# Patient Record
Sex: FEMALE | Race: WHITE | NOT HISPANIC OR LATINO | Employment: FULL TIME | URBAN - METROPOLITAN AREA
[De-identification: names, ages, dates, MRNs, and addresses within clinical notes are randomized per-mention and may not be internally consistent; named-entity substitution may affect disease eponyms.]

---

## 2022-07-07 ENCOUNTER — APPOINTMENT (OUTPATIENT)
Dept: LAB | Facility: HOSPITAL | Age: 37
End: 2022-07-07
Attending: INTERNAL MEDICINE
Payer: COMMERCIAL

## 2022-07-07 DIAGNOSIS — R05.9 COUGH: ICD-10-CM

## 2022-07-07 DIAGNOSIS — R68.89 CONGESTION OF THROAT: ICD-10-CM

## 2022-07-07 LAB — SARS-COV-2 RNA RESP QL NAA+PROBE: NOT DETECTED

## 2022-07-07 PROCEDURE — 87635 SARS-COV-2 COVID-19 AMP PRB: CPT

## 2022-09-02 ENCOUNTER — LAB REQUISITION (OUTPATIENT)
Dept: LAB | Facility: HOSPITAL | Age: 37
End: 2022-09-02
Payer: COMMERCIAL

## 2022-09-02 DIAGNOSIS — R50.9 FEVER, UNSPECIFIED: ICD-10-CM

## 2022-09-02 LAB — SARS-COV-2 RNA RESP QL NAA+PROBE: DETECTED

## 2022-09-02 PROCEDURE — 87635 SARS-COV-2 COVID-19 AMP PRB: CPT | Performed by: INTERNAL MEDICINE

## 2023-09-18 ENCOUNTER — HOSPITAL ENCOUNTER (INPATIENT)
Facility: HOSPITAL | Age: 38
LOS: 2 days | Discharge: HOME | DRG: 065 | End: 2023-09-20
Attending: STUDENT IN AN ORGANIZED HEALTH CARE EDUCATION/TRAINING PROGRAM | Admitting: STUDENT IN AN ORGANIZED HEALTH CARE EDUCATION/TRAINING PROGRAM
Payer: COMMERCIAL

## 2023-09-18 DIAGNOSIS — Q21.12 PFO (PATENT FORAMEN OVALE): ICD-10-CM

## 2023-09-18 DIAGNOSIS — I63.9 ACUTE CVA (CEREBROVASCULAR ACCIDENT): Primary | ICD-10-CM

## 2023-09-18 DIAGNOSIS — I63.9 ACUTE STROKE DUE TO ISCHEMIA: ICD-10-CM

## 2023-09-18 DIAGNOSIS — I63.9 CRYPTOGENIC STROKE: ICD-10-CM

## 2023-09-18 LAB
(HCYS)2 SERPL-MCNC: 6.2 UMOL/L (ref 5.1–15.4)
ALBUMIN SERPL-MCNC: 4 G/DL (ref 3.5–5)
ALBUMIN/GLOB SERPL: 1.2 RATIO (ref 1.1–2)
ALP SERPL-CCNC: 48 UNIT/L (ref 40–150)
ALT SERPL-CCNC: 19 UNIT/L (ref 0–55)
APTT PPP: 22.6 SECONDS (ref 23.2–33.7)
AST SERPL-CCNC: 19 UNIT/L (ref 5–34)
B-HCG SERPL QL: NEGATIVE
BASOPHILS # BLD AUTO: 0.04 X10(3)/MCL
BASOPHILS NFR BLD AUTO: 0.7 %
BILIRUB SERPL-MCNC: 0.6 MG/DL
BUN SERPL-MCNC: 8.5 MG/DL (ref 7–18.7)
CALCIUM SERPL-MCNC: 9.7 MG/DL (ref 8.4–10.2)
CHLORIDE SERPL-SCNC: 105 MMOL/L (ref 98–107)
CHOLEST SERPL-MCNC: 160 MG/DL
CHOLEST/HDLC SERPL: 3 {RATIO} (ref 0–5)
CO2 SERPL-SCNC: 23 MMOL/L (ref 22–29)
CREAT SERPL-MCNC: 0.81 MG/DL (ref 0.55–1.02)
CRP SERPL-MCNC: <1 MG/L
D DIMER PPP IA.FEU-MCNC: 0.53 UG/ML FEU (ref 0–0.5)
EOSINOPHIL # BLD AUTO: 0.16 X10(3)/MCL (ref 0–0.9)
EOSINOPHIL NFR BLD AUTO: 2.6 %
ERYTHROCYTE [DISTWIDTH] IN BLOOD BY AUTOMATED COUNT: 13 % (ref 11.5–17)
FACT VIII ACT/NOR PPP: 212 % (ref 59–191)
FIBRINOGEN PPP-MCNC: 300 MG/DL (ref 210–463)
GFR SERPLBLD CREATININE-BSD FMLA CKD-EPI: >60 MLS/MIN/1.73/M2
GLOBULIN SER-MCNC: 3.4 GM/DL (ref 2.4–3.5)
GLUCOSE SERPL-MCNC: 97 MG/DL (ref 74–100)
HCT VFR BLD AUTO: 42.1 % (ref 37–47)
HDLC SERPL-MCNC: 46 MG/DL (ref 35–60)
HGB BLD-MCNC: 14.2 G/DL (ref 12–16)
IMM GRANULOCYTES # BLD AUTO: 0.02 X10(3)/MCL (ref 0–0.04)
IMM GRANULOCYTES NFR BLD AUTO: 0.3 %
INR PPP: 0.9
LDLC SERPL CALC-MCNC: 84 MG/DL (ref 50–140)
LYMPHOCYTES # BLD AUTO: 1.87 X10(3)/MCL (ref 0.6–4.6)
LYMPHOCYTES NFR BLD AUTO: 30.9 %
MAGNESIUM SERPL-MCNC: 2.1 MG/DL (ref 1.6–2.6)
MCH RBC QN AUTO: 29.5 PG (ref 27–31)
MCHC RBC AUTO-ENTMCNC: 33.7 G/DL (ref 33–36)
MCV RBC AUTO: 87.5 FL (ref 80–94)
MONOCYTES # BLD AUTO: 0.42 X10(3)/MCL (ref 0.1–1.3)
MONOCYTES NFR BLD AUTO: 6.9 %
NEUTROPHILS # BLD AUTO: 3.55 X10(3)/MCL (ref 2.1–9.2)
NEUTROPHILS NFR BLD AUTO: 58.6 %
NRBC BLD AUTO-RTO: 0 %
PLATELET # BLD AUTO: 233 X10(3)/MCL (ref 130–400)
PMV BLD AUTO: 10.1 FL (ref 7.4–10.4)
POCT GLUCOSE: 80 MG/DL (ref 70–110)
POTASSIUM SERPL-SCNC: 4.2 MMOL/L (ref 3.5–5.1)
PROT SERPL-MCNC: 7.4 GM/DL (ref 6.4–8.3)
PROTHROMBIN TIME: 12.4 SECONDS (ref 12.5–14.5)
RBC # BLD AUTO: 4.81 X10(6)/MCL (ref 4.2–5.4)
SODIUM SERPL-SCNC: 135 MMOL/L (ref 136–145)
TRIGL SERPL-MCNC: 148 MG/DL (ref 37–140)
TSH SERPL-ACNC: 2.54 UIU/ML (ref 0.35–4.94)
VLDLC SERPL CALC-MCNC: 30 MG/DL
WBC # SPEC AUTO: 6.06 X10(3)/MCL (ref 4.5–11.5)

## 2023-09-18 PROCEDURE — 81025 URINE PREGNANCY TEST: CPT | Performed by: NURSE PRACTITIONER

## 2023-09-18 PROCEDURE — 99223 PR INITIAL HOSPITAL CARE,LEVL III: ICD-10-PCS | Mod: ,,, | Performed by: PSYCHIATRY & NEUROLOGY

## 2023-09-18 PROCEDURE — 99285 EMERGENCY DEPT VISIT HI MDM: CPT | Mod: 25

## 2023-09-18 PROCEDURE — 86147 CARDIOLIPIN ANTIBODY EA IG: CPT | Performed by: STUDENT IN AN ORGANIZED HEALTH CARE EDUCATION/TRAINING PROGRAM

## 2023-09-18 PROCEDURE — 80061 LIPID PANEL: CPT | Performed by: NURSE PRACTITIONER

## 2023-09-18 PROCEDURE — 85025 COMPLETE CBC W/AUTO DIFF WBC: CPT | Performed by: NURSE PRACTITIONER

## 2023-09-18 PROCEDURE — 11000001 HC ACUTE MED/SURG PRIVATE ROOM

## 2023-09-18 PROCEDURE — 84443 ASSAY THYROID STIM HORMONE: CPT | Performed by: NURSE PRACTITIONER

## 2023-09-18 PROCEDURE — 86140 C-REACTIVE PROTEIN: CPT | Performed by: NURSE PRACTITIONER

## 2023-09-18 PROCEDURE — 85300 ANTITHROMBIN III ACTIVITY: CPT | Performed by: NURSE PRACTITIONER

## 2023-09-18 PROCEDURE — 85730 THROMBOPLASTIN TIME PARTIAL: CPT | Performed by: NURSE PRACTITIONER

## 2023-09-18 PROCEDURE — 85613 RUSSELL VIPER VENOM DILUTED: CPT | Performed by: NURSE PRACTITIONER

## 2023-09-18 PROCEDURE — 21400001 HC TELEMETRY ROOM

## 2023-09-18 PROCEDURE — 85610 PROTHROMBIN TIME: CPT | Performed by: NURSE PRACTITIONER

## 2023-09-18 PROCEDURE — 80053 COMPREHEN METABOLIC PANEL: CPT | Performed by: NURSE PRACTITIONER

## 2023-09-18 PROCEDURE — 82962 GLUCOSE BLOOD TEST: CPT

## 2023-09-18 PROCEDURE — 99223 1ST HOSP IP/OBS HIGH 75: CPT | Mod: ,,, | Performed by: PSYCHIATRY & NEUROLOGY

## 2023-09-18 PROCEDURE — 83695 ASSAY OF LIPOPROTEIN(A): CPT | Performed by: NURSE PRACTITIONER

## 2023-09-18 PROCEDURE — 83090 ASSAY OF HOMOCYSTEINE: CPT | Performed by: NURSE PRACTITIONER

## 2023-09-18 PROCEDURE — 25000003 PHARM REV CODE 250: Performed by: NURSE PRACTITIONER

## 2023-09-18 PROCEDURE — 85303 CLOT INHIBIT PROT C ACTIVITY: CPT | Performed by: NURSE PRACTITIONER

## 2023-09-18 PROCEDURE — 83735 ASSAY OF MAGNESIUM: CPT | Performed by: NURSE PRACTITIONER

## 2023-09-18 PROCEDURE — 25500020 PHARM REV CODE 255: Performed by: STUDENT IN AN ORGANIZED HEALTH CARE EDUCATION/TRAINING PROGRAM

## 2023-09-18 PROCEDURE — 85379 FIBRIN DEGRADATION QUANT: CPT | Performed by: NURSE PRACTITIONER

## 2023-09-18 PROCEDURE — 85240 CLOT FACTOR VIII AHG 1 STAGE: CPT | Performed by: NURSE PRACTITIONER

## 2023-09-18 PROCEDURE — 85384 FIBRINOGEN ACTIVITY: CPT | Performed by: NURSE PRACTITIONER

## 2023-09-18 RX ORDER — ASPIRIN 325 MG
325 TABLET, DELAYED RELEASE (ENTERIC COATED) ORAL
Status: COMPLETED | OUTPATIENT
Start: 2023-09-18 | End: 2023-09-18

## 2023-09-18 RX ORDER — LABETALOL HYDROCHLORIDE 5 MG/ML
10 INJECTION, SOLUTION INTRAVENOUS EVERY 6 HOURS PRN
Status: DISCONTINUED | OUTPATIENT
Start: 2023-09-18 | End: 2023-09-20 | Stop reason: HOSPADM

## 2023-09-18 RX ADMIN — IOPAMIDOL 100 ML: 755 INJECTION, SOLUTION INTRAVENOUS at 05:09

## 2023-09-18 RX ADMIN — ASPIRIN 325 MG: 325 TABLET, COATED ORAL at 03:09

## 2023-09-18 NOTE — HPI
37 year old female with a past medical history of migraines presented to ED on 9/18 for right arm numbness. She reported she went to bed on 9/17 at 10:30 pm and felt normal. She woke up at 5:00 am, worked out, got her kids ready. At 7:30 am, she was sitting on the toilet and had sudden onset right arm numbness. She did not have weakness or coordination problems. She was able to bathe herself without difficulties. She called her dad to help with the kids and came to ED. She reports she is not on a preventative for migraines but has had them since she was 10 years ago, feels like they are hormonal, has been seeing a chiropractor for migraines. CT head was negative. MRI brain showed an acute left frontal infarct as well as an old left cerebellar infarct. Neurology was consulted for stroke workup.

## 2023-09-18 NOTE — SUBJECTIVE & OBJECTIVE
No past medical history on file.    No past surgical history on file.    Review of patient's allergies indicates:  Not on File    Current Neurological Medications:     No current facility-administered medications on file prior to encounter.     No current outpatient medications on file prior to encounter.     Family History    None       Tobacco Use    Smoking status: Not on file    Smokeless tobacco: Not on file   Substance and Sexual Activity    Alcohol use: Not on file    Drug use: Not on file    Sexual activity: Not on file     Review of Systems   Neurological:  Positive for numbness. Negative for dizziness, tremors, seizures, syncope, facial asymmetry, speech difficulty, weakness, light-headedness and headaches.   All other systems reviewed and are negative.    Objective:     Vital Signs (Most Recent):  Temp: 98.2 °F (36.8 °C) (09/18/23 0900)  Pulse: 69 (09/18/23 1535)  Resp: 18 (09/18/23 1535)  BP: 117/88 (09/18/23 1535)  SpO2: 100 % (09/18/23 1535) Vital Signs (24h Range):  Temp:  [98.2 °F (36.8 °C)] 98.2 °F (36.8 °C)  Pulse:  [62-71] 69  Resp:  [16-19] 18  SpO2:  [98 %-100 %] 100 %  BP: (117-129)/(58-88) 117/88     Weight: 75.8 kg (167 lb)  There is no height or weight on file to calculate BMI.     Physical Exam  Vitals and nursing note reviewed.   Constitutional:       General: She is not in acute distress.     Appearance: Normal appearance. She is not toxic-appearing.   HENT:      Head: Normocephalic.   Eyes:      General: No visual field deficit.     Extraocular Movements:      Right eye: Normal extraocular motion and no nystagmus.      Left eye: Normal extraocular motion and no nystagmus.      Pupils: Pupils are equal, round, and reactive to light.   Cardiovascular:      Rate and Rhythm: Normal rate.      Pulses: Normal pulses.   Pulmonary:      Effort: Pulmonary effort is normal.      Breath sounds: Normal breath sounds.   Musculoskeletal:         General: No swelling. Normal range of motion.       Cervical back: Normal range of motion.      Right lower leg: No edema.      Left lower leg: No edema.   Skin:     General: Skin is warm and dry.      Capillary Refill: Capillary refill takes less than 2 seconds.   Neurological:      General: No focal deficit present.      Mental Status: She is alert and oriented to person, place, and time.      Cranial Nerves: No cranial nerve deficit, dysarthria or facial asymmetry.      Sensory: Sensory deficit (decreased sensation to right arm (right forearm to fingers)) present.      Motor: No weakness, tremor, atrophy, abnormal muscle tone, seizure activity or pronator drift.      Coordination: Coordination normal. Finger-Nose-Finger Test normal.   Psychiatric:         Attention and Perception: Attention normal.         Mood and Affect: Affect normal.         Speech: Speech normal.         Behavior: Behavior normal. Behavior is cooperative.          NEUROLOGICAL EXAMINATION:     MENTAL STATUS   Oriented to person, place, and time.   Speech: speech is normal     CRANIAL NERVES     CN III, IV, VI   Pupils are equal, round, and reactive to light.    GAIT AND COORDINATION      Coordination   Finger to nose coordination: normal      Significant Labs:   Recent Lab Results         09/18/23  1505   09/18/23  1113   09/18/23  0958   09/18/23  0903        Albumin/Globulin Ratio     1.2         Albumin     4.0         Alkaline Phosphatase     48         ALT     19         aPTT 22.6  Comment: For Minimal Heparin Infusion, the goal aPTT 64-85 seconds corresponds to an anti-Xa of 0.3-0.5.    For Low Intensity and High Intensity Heparin, the goal aPTT  seconds corresponds to an anti-Xa of 0.3-0.7             AST     19         Baso #     0.04         Basophil %     0.7         BILIRUBIN TOTAL     0.6         BUN     8.5         Calcium     9.7         Chloride     105         Cholesterol     160         CO2     23         Creatinine     0.81         eGFR     >60         Eos #     0.16          Eosinophil %     2.6         Globulin, Total     3.4         Glucose     97         HDL     46         Hematocrit     42.1         Hemoglobin     14.2         Immature Grans (Abs)     0.02         Immature Granulocytes     0.3         INR 0.9             LDL Cholesterol External     84.00         Lymph #     1.87         LYMPH %     30.9         Magnesium      2.10         MCH     29.5         MCHC     33.7         MCV     87.5         Mono #     0.42         Mono %     6.9         MPV     10.1         Neut #     3.55         Neut %     58.6         nRBC     0.0         Platelets     233         POCT Glucose       80       Potassium     4.2         Preg Test, Ur   Negative           PROTEIN TOTAL     7.4         Protime 12.4             RBC     4.81         RDW     13.0         Sodium     135         Thyroid Stimulating Hormone     2.544         Total Cholesterol/HDL Ratio     3         Triglycerides     148         Very Low Density Lipoprotein     30         WBC     6.06                 Significant Imaging: I have reviewed all pertinent imaging results/findings within the past 24 hours.

## 2023-09-18 NOTE — ED NOTES
Patient reports right arm numbness starting today. Reports that numbness was initially her entire right arm and has improved to only numbness to right hand at this time. No other obvious neuro deficits noted. Bilateral upper extremity strength 5/5.

## 2023-09-18 NOTE — ED PROVIDER NOTES
"Encounter Date: 9/18/2023       History     Chief Complaint   Patient presents with    Extremity Weakness     Right arm numbness that started an hr ago. Denies ha, slurred speech, - facial droop, no drift. . GCS 15. Neuro intact. Denies cp, sob, n/v     See MDM    The history is provided by the patient. No  was used.     Review of patient's allergies indicates:  Not on File  No past medical history on file.  No past surgical history on file.  No family history on file.     Review of Systems   Neurological:  Positive for numbness.        Right arm numbness and feels "foreign"   All other systems reviewed and are negative.      Physical Exam     Initial Vitals [09/18/23 0900]   BP Pulse Resp Temp SpO2   129/85 71 19 98.2 °F (36.8 °C) 98 %      MAP       --         Physical Exam    Nursing note and vitals reviewed.  Constitutional: She appears well-developed and well-nourished.   Eyes: Conjunctivae are normal.   Neck:   Normal range of motion.  Cardiovascular:  Normal rate, regular rhythm and normal heart sounds.           Pulmonary/Chest: Breath sounds normal. No respiratory distress.   Musculoskeletal:      Cervical back: Normal range of motion.     Neurological: She is alert and oriented to person, place, and time. She has normal strength. A sensory deficit is present. Coordination and gait normal.   Bilateral UE and LE strength equal and strong    She has decreased sensation along the fingers, palm. Forearm/wrist has intact sensation, forearm intact sensation, upper arm and shoulder intact sensation    Full ROM   Skin: Skin is warm and dry.   Psychiatric: She has a normal mood and affect.         ED Course   Procedures  Labs Reviewed   COMPREHENSIVE METABOLIC PANEL - Abnormal; Notable for the following components:       Result Value    Sodium Level 135 (*)     All other components within normal limits   LIPID PANEL - Abnormal; Notable for the following components:    Triglyceride 148 (*)     All " other components within normal limits   MAGNESIUM - Normal   PREGNANCY TEST, URINE RAPID - Normal   CBC WITH DIFFERENTIAL   PROTIME-INR   APTT   TSH   POCT GLUCOSE          Imaging Results              MRI Brain Without Contrast (Final result)  Result time 09/18/23 14:36:41      Final result by Zaheer Amezquita MD (09/18/23 14:36:41)                   Impression:      1.  Left frontal lobe acute nonhemorrhagic lacunar infarcts.    2.  Left cerebellar old lacunar infarct and very minimal chronic microvascular ischemia.      Electronically signed by: Zaheer Amezquita  Date:    09/18/2023  Time:    14:36               Narrative:    EXAMINATION:  MRI BRAIN WITHOUT CONTRAST    CLINICAL HISTORY:  Transient ischemic attack (TIA);    TECHNIQUE:  Multiplanar MRI sequences were performed of the brain without contrast.    COMPARISON:  CT of the brain September 18, 2023    FINDINGS:  There are acute nonhemorrhagic lacunar infarcts involving the left frontal lobe with diffusion weighted restrictions and ADC map dark signal on images 10 and 8 series 4.  There are also associated T2 FLAIR hyperintensities.  There is left cerebellar old lacunar infarct.  Right periventricular punctate T2 FLAIR hyperintensity is consistent with very minimal chronic ischemia. Gradient echo sequences demonstrate no evidence of de phasing artifact to suggest hemorrhagic byproducts. No evidence of diffusion restriction or ADC map signal drop out to suggest acute infarct. The sella and suprasellar areas are unremarkable.    The cerebellar tonsils are normally positioned. There is no acute intracranial hemorrhage, hydrocephalus, midline shift or mass effect. No acute extra axial fluid collections identified. The mastoid air cells are clear.                                       CT Head Without Contrast (Final result)  Result time 09/18/23 12:51:34      Final result by Semaj Gaona MD (09/18/23 12:51:34)                   Impression:      No acute  intracranial abnormality identified.  Findings of mild microvascular ischemic disease.      Electronically signed by: Semaj Gaona  Date:    09/18/2023  Time:    12:51               Narrative:    EXAMINATION:  CT HEAD WITHOUT CONTRAST    CLINICAL HISTORY:  Transient ischemic attack (TIA);    TECHNIQUE:  Low dose axial images were obtained through the head.  Coronal and sagittal reformations were also performed. Contrast was not administered.    Automatic exposure control was utilized to reduce the patient's radiation dose.    DLP= 954    COMPARISON:  None.    FINDINGS:  No acute intracranial hemorrhage, edema or mass. No acute parenchymal abnormality.    Mild cerebral atrophy with concordant ventricular enlargement.    There is normal gray white differentiation.    The osseous structures are normal.    The mastoid air cells are clear.    The auditory canals are patent bilaterally.    The globes and orbital contents are normal bilaterally.    The visualized maxillary, ethmoid and sphenoid sinuses are clear.                                       Medications   labetaloL injection 10 mg (has no administration in time range)   aspirin EC tablet 325 mg (325 mg Oral Given 9/18/23 0784)     Medical Decision Making  37-year-old female presents with sudden onset of right arm numbness from the top of her shoulder all the way to her fingertips that started around 730 this morning.  She states that she had been up doing her normal routine since 5:00 a.m. worked out with feeding the children and actually went to the bathroom where the right arm numbness started.  She states that it also felt like it weight 100 lb and it felt very foreign like she was not in control of her arm and it felt like someone else's arm.  She states that this made her nervous and so she decided to come and get it checked out after bringing the kids to school.  She states that as time has continued need the numbness has progressively improved and is now  only in her right hand and the foreign sensation and inability to control it has resolved as well she now has complete control of her right hand.  She denies any past medical history denies smoking.  She is on oral contraceptives.  She does have a history of migraines since she was 10 years old usually associated with her menstrual cycle however she does not currently have a headache she denies any neck pain there has been no trauma to her neck.    Labs unremarkable for acute findings. Ct shows chronic microvascular. With her OCP along with ct findings and concern with the story of the right arm numbness and questionable heaviness will get MRI brain.   MRI brain shows acute stroke. Will admit. Aspirin. Lipid panel. Stop OCP.     Amount and/or Complexity of Data Reviewed  Labs: ordered. Decision-making details documented in ED Course.  Radiology: ordered. Decision-making details documented in ED Course.  Discussion of management or test interpretation with external provider(s): Discussed with dr. Hernandez who also had face to face with patient.     Discussed with SANJAY Pavon with  who agrees to accept.     Risk  OTC drugs.  Decision regarding hospitalization.      Additional MDM:   Differential Diagnosis:   Other: The following diagnoses were also considered and will be evaluated: nerve palsy, stroke and electrolyte imbalance.            ED Course as of 09/18/23 1553   Mon Sep 18, 2023   1453 Patient is a 37-year-old female presents to the emergency department for right upper extremity numbness.  See HPI.  See physical exam.  She was in normal state of health this morning.  She had finished morning errands, had worked out and was used to the restroom when she began having sudden numbness.  On arrival she did not have any neuro deficits.  CT of the head did not show any evidence of acute stroke.  The last risk factors MRI obtained due to patient's symptomatology.  MRI with evidence of some frontal lobe infarct.  Patient  not a candidate for TNK, tPA due to minimal symptoms, NIH of 1.  Risks of bleeding outweigh benefits at this time.  Low suspicion for any large vessel occlusion given that she is not having any weakness and symptoms appear to be rapidly resolving.  Aspirin has been ordered.  Patient will be admitted for continued CVA workup and risk modification. [RP]      ED Course User Index  [RP] Fabio Hernandez MD                    Clinical Impression:   Final diagnoses:  [I63.9] Acute stroke due to ischemia        ED Disposition Condition    Admit                 Lauren Daigle, [facility]  09/18/23 7759

## 2023-09-18 NOTE — H&P
[facility]  Hospital Medicine History & Physical Examination       Patient Name: Sharon Shaver  MRN: [id number]  Patient Class: IP- Inpatient   Admission Date: 09/18/2023   Admitting Service: Hospital Medicine   Length of Stay: 0  Attending Physician: Tj Young MD  Primary Care Provider: Jennifer, Primary Doctor  Face-to-Face encounter date: 09/18/2023  Code Status:  Full code  Chief Complaint: Extremity Weakness (Right arm numbness that started an hr ago. Denies ha, slurred speech, - facial droop, no drift. . GCS 15. Neuro intact. Denies cp, sob, n/v)      Present at Bedside:  Spouse  Patient information was obtained from patient, patient's family, past medical records and ER records.      HISTORY OF PRESENT ILLNESS:   Sharon Shaver is a 37 y.o. female with PMHx of migraines who presented to [facility] on 9/18/2023 with c/o right arm numbness that began around 7:00 a.m. on the morning of presentation.  She denied any headache, slurred speech, CP, SOB, nausea, vomiting, vision changes.  Patient reports the numbness has improved and is now only affecting the fingers on her right hand. Initial NIH of 1. Patient reports a history of menstrual migraines that were worse with her menstrual cycle for which she has been on OCPs since she was a teenager, she stopped when she had her children and resumed taking OCPs in 2016 due to recurrent migraines.    Initial ED vital signs include /85, HR 71, RR 19, SpO2 98% on room air, temperature 98.2° F.  Labs notable for triglycerides 148.  Urine pregnancy test negative.  CT head without contrast negative for acute intracranial abnormality, findings of mild microvascular ischemic disease noted.  MRI of the brain without contrast demonstrated left frontal lobe acute nonhemorrhagic lacunar infarcts, left cerebellar old lacunar infarct and very minimal chronic microvascular ischemia.  She was given aspirin 325 mg p.o. in the ED. admitted to hospital medicine services  for further CVA workup.    REVIEW OF SYSTEMS:   Except as documented, all other systems reviewed and negative     PAST MEDICAL HISTORY:   Migraines    PAST SURGICAL HISTORY:   Negative    FAMILY HISTORY:   Mother: CVA  Aunt: Dementia  Father: PPM    SOCIAL HISTORY:   Denied alcohol, tobacco or illicit drug use    ALLERGIES:   Patient has no allergy information on record.    HOME MEDICATIONS:     Prior to Admission medications    Not on File     ________________________________________________________________________  INPATIENT LIST OF MEDICATIONS     Current Facility-Administered Medications:     labetaloL injection 10 mg, 10 mg, Intravenous, Q6H PRN, Monique Tamez, FELECIA-BC  No current outpatient medications on file.    Scheduled Meds:  Continuous Infusions:  PRN Meds:.labetalol    PHYSICAL EXAM:     VITAL SIGNS: 24 HRS MIN & MAX LAST   Temp  Min: 98.2 °F (36.8 °C)  Max: 98.2 °F (36.8 °C) 98.2 °F (36.8 °C)   BP  Min: 118/81  Max: 129/85 (!) 128/58   Pulse  Min: 62  Max: 71  64   Resp  Min: 16  Max: 19 16   SpO2  Min: 98 %  Max: 100 % 99 %       General appearance: Well-developed female in no apparent distress.  HENT: Atraumatic head. Moist mucous membranes of oral cavity.  Eyes: Normal extraocular movements.   Neck: Supple.   Lungs: Clear to auscultation bilaterally.   Heart: Regular rate and rhythm. S1 and S2 present. No pedal edema.  Abdomen: Soft, non-distended, non-tender.  Extremities: No cyanosis, clubbing  Skin: No Rash.   Neuro: Motor and sensory exams grossly intact. Right hand paresthesias.  Psych/mental status: Appropriate mood and affect. Responds appropriately to questions.     LABS AND IMAGING:     Recent Labs   Lab 09/18/23  0958   WBC 6.06   RBC 4.81   HGB 14.2   HCT 42.1   MCV 87.5   MCH 29.5   MCHC 33.7   RDW 13.0      MPV 10.1       Recent Labs   Lab 09/18/23  0958   *   K 4.2   CO2 23   BUN 8.5   CREATININE 0.81   CALCIUM 9.7   MG 2.10   ALBUMIN 4.0   ALKPHOS 48   ALT 19   AST  19   BILITOT 0.6       Microbiology Results (last 7 days)       ** No results found for the last 168 hours. **             MRI Brain Without Contrast  Narrative: EXAMINATION:  MRI BRAIN WITHOUT CONTRAST    CLINICAL HISTORY:  Transient ischemic attack (TIA);    TECHNIQUE:  Multiplanar MRI sequences were performed of the brain without contrast.    COMPARISON:  CT of the brain September 18, 2023    FINDINGS:  There are acute nonhemorrhagic lacunar infarcts involving the left frontal lobe with diffusion weighted restrictions and ADC map dark signal on images 10 and 8 series 4.  There are also associated T2 FLAIR hyperintensities.  There is left cerebellar old lacunar infarct.  Right periventricular punctate T2 FLAIR hyperintensity is consistent with very minimal chronic ischemia. Gradient echo sequences demonstrate no evidence of de phasing artifact to suggest hemorrhagic byproducts. No evidence of diffusion restriction or ADC map signal drop out to suggest acute infarct. The sella and suprasellar areas are unremarkable.    The cerebellar tonsils are normally positioned. There is no acute intracranial hemorrhage, hydrocephalus, midline shift or mass effect. No acute extra axial fluid collections identified. The mastoid air cells are clear.  Impression: 1.  Left frontal lobe acute nonhemorrhagic lacunar infarcts.    2.  Left cerebellar old lacunar infarct and very minimal chronic microvascular ischemia.    Electronically signed by: Zaheer Amezquita  Date:    09/18/2023  Time:    14:36  CT Head Without Contrast  Narrative: EXAMINATION:  CT HEAD WITHOUT CONTRAST    CLINICAL HISTORY:  Transient ischemic attack (TIA);    TECHNIQUE:  Low dose axial images were obtained through the head.  Coronal and sagittal reformations were also performed. Contrast was not administered.    Automatic exposure control was utilized to reduce the patient's radiation dose.    DLP= 954    COMPARISON:  None.    FINDINGS:  No acute intracranial  hemorrhage, edema or mass. No acute parenchymal abnormality.    Mild cerebral atrophy with concordant ventricular enlargement.    There is normal gray white differentiation.    The osseous structures are normal.    The mastoid air cells are clear.    The auditory canals are patent bilaterally.    The globes and orbital contents are normal bilaterally.    The visualized maxillary, ethmoid and sphenoid sinuses are clear.  Impression: No acute intracranial abnormality identified.  Findings of mild microvascular ischemic disease.    Electronically signed by: Semaj Jimenez  Date:    09/18/2023  Time:    12:51        ASSESSMENT & PLAN:   Acute left frontal lobe nonhemorrhagic CVA  Left cerebellar old lacunar infarct  - per MRI  Hx of Menstrual Cycle Related Migraines, OCP use    Plan:  Neurology consulted, follow up with recommendations  B Carotid US and ECHO pending  Lipid panel, hemoglobin A1c pending  Atypical CVA labs including , CRP, DRVVT, factor 8 assay, homocystine, lipoprotein a, protein C activity pending  Hold antihypertensives to allow for permissive hypertension  PRN labetalol as needed for SBP greater than 220 or DBP greater than 100  PT/OT/SLP to evaluate and treat when appropriate  Neuro checks every 4 hours  Fall precautions  Resume other appropriate home medications  Labs in AM     VTE Prophylaxis: SCDs    Discharge Planning and Disposition: TBD    DONTRELL, Monique Tamez, NP have reviewed and discussed the case with Tj Young MD  Please see the attending MD's addendum for further assessment and plan.    Monique Tamez, AGACNP-BC  09/18/2023    _______________________________________________________________________________  MD Addendum:  Dr. DONTRELL , assumed care of this patient today at --am/pm  For the patient encounter, I performed the substantive portion of the visit, I reviewed the NP/PA documentation, treatment plan, and medical decision making.  I had face to face time with this patient     LOREN  History:    B. Physical exam:    C. Medical decision making:      All diagnosis and differential diagnosis have been reviewed; assessment and plan has been documented; I have personally reviewed the labs and test results that are presently available; I have reviewed the patients medication list; I have reviewed the consulting providers response and recommendations. I have reviewed or attempted to review medical records based upon their availability.    All of the patient and family questions have been addressed and answered. Patient's is agreeable to the above stated plan. I will continue to monitor closely and make adjustments to medical management as needed.      09/18/2023

## 2023-09-18 NOTE — CONSULTS
[facility] - Emergency Dept  Neurology  Consult Note    Patient Name: Sharon Shaver  MRN: [id number]  Admission Date: 9/18/2023  Hospital Length of Stay: 0 days  Code Status: Full Code   Attending Provider: Tj Young MD   Consulting Provider: Lisa Springer NP  Primary Care Physician: Jennifer, Primary Doctor  Principal Problem:<principal problem not specified>    Inpatient consult to Neurology  Consult performed by: Lisa Springer NP  Consult ordered by: Monique Tamez, AGACNP-BC         Subjective:     Chief Complaint:  Right sided numbness       HPI:   37 year old female with a past medical history of migraines presented to ED on 9/18 for right arm numbness. She reported she went to bed on 9/17 at 10:30 pm and felt normal. She woke up at 5:00 am, worked out, got her kids ready. At 7:30 am, she was sitting on the toilet and had sudden onset right arm numbness. She did not have weakness or coordination problems. She was able to bathe herself without difficulties. She called her dad to help with the kids and came to ED. She reports she is not on a preventative for migraines but has had them since she was 10 years ago, feels like they are hormonal, has been seeing a chiropractor for migraines. CT head was negative. MRI brain showed an acute left frontal infarct as well as an old left cerebellar infarct. Neurology was consulted for stroke workup.        No past medical history on file.    No past surgical history on file.    Review of patient's allergies indicates:  Not on File    Current Neurological Medications:     No current facility-administered medications on file prior to encounter.     No current outpatient medications on file prior to encounter.     Family History    None       Tobacco Use    Smoking status: Not on file    Smokeless tobacco: Not on file   Substance and Sexual Activity    Alcohol use: Not on file    Drug use: Not on file    Sexual activity: Not on file     Review of  Systems   Neurological:  Positive for numbness. Negative for dizziness, tremors, seizures, syncope, facial asymmetry, speech difficulty, weakness, light-headedness and headaches.   All other systems reviewed and are negative.    Objective:     Vital Signs (Most Recent):  Temp: 98.2 °F (36.8 °C) (09/18/23 0900)  Pulse: 69 (09/18/23 1535)  Resp: 18 (09/18/23 1535)  BP: 117/88 (09/18/23 1535)  SpO2: 100 % (09/18/23 1535) Vital Signs (24h Range):  Temp:  [98.2 °F (36.8 °C)] 98.2 °F (36.8 °C)  Pulse:  [62-71] 69  Resp:  [16-19] 18  SpO2:  [98 %-100 %] 100 %  BP: (117-129)/(58-88) 117/88     Weight: 75.8 kg (167 lb)  There is no height or weight on file to calculate BMI.     Physical Exam  Vitals and nursing note reviewed.   Constitutional:       General: She is not in acute distress.     Appearance: Normal appearance. She is not toxic-appearing.   HENT:      Head: Normocephalic.   Eyes:      General: No visual field deficit.     Extraocular Movements:      Right eye: Normal extraocular motion and no nystagmus.      Left eye: Normal extraocular motion and no nystagmus.      Pupils: Pupils are equal, round, and reactive to light.   Cardiovascular:      Rate and Rhythm: Normal rate.      Pulses: Normal pulses.   Pulmonary:      Effort: Pulmonary effort is normal.      Breath sounds: Normal breath sounds.   Musculoskeletal:         General: No swelling. Normal range of motion.      Cervical back: Normal range of motion.      Right lower leg: No edema.      Left lower leg: No edema.   Skin:     General: Skin is warm and dry.      Capillary Refill: Capillary refill takes less than 2 seconds.   Neurological:      General: No focal deficit present.      Mental Status: She is alert and oriented to person, place, and time.      Cranial Nerves: No cranial nerve deficit, dysarthria or facial asymmetry.      Sensory: Sensory deficit (decreased sensation to right arm (right forearm to fingers)) present.      Motor: No weakness,  tremor, atrophy, abnormal muscle tone, seizure activity or pronator drift.      Coordination: Coordination normal. Finger-Nose-Finger Test normal.   Psychiatric:         Attention and Perception: Attention normal.         Mood and Affect: Affect normal.         Speech: Speech normal.         Behavior: Behavior normal. Behavior is cooperative.          NEUROLOGICAL EXAMINATION:     MENTAL STATUS   Oriented to person, place, and time.   Speech: speech is normal     CRANIAL NERVES     CN III, IV, VI   Pupils are equal, round, and reactive to light.    GAIT AND COORDINATION      Coordination   Finger to nose coordination: normal    Current NIH Stroke Score Values      Flowsheet Row Most Recent Value   1a. Level of Consciousness 0-->Alert, keenly responsive   1b. LOC Questions 0-->Answers both questions correctly   1c. LOC Commands 0-->Performs both tasks correctly   2. Best Gaze 0-->Normal   3. Visual 0-->No visual loss   4. Facial Palsy 0-->Normal symmetrical movements   5a. Motor Arm, Left 0-->No drift, limb holds 90 (or 45) degrees for full 10 secs   5b. Motor Arm, Right 0-->No drift, limb holds 90 (or 45) degrees for full 10 secs   6a. Motor Leg, Left 0-->No drift, leg holds 30 degree position for full 5 secs   6b. Motor Leg, Right 0-->No drift, leg holds 30 degree position for full 5 secs   7. Limb Ataxia 0-->Absent   8. Sensory 1-->decreased sensation    9. Best Language 0-->No aphasia, normal   10. Dysarthria 0-->Normal   11. Extinction and Inattention (formerly Neglect) 0-->No abnormality   Total (NIH Stroke Scale) 1              Significant Labs:   Recent Lab Results         09/18/23  1505   09/18/23  1113   09/18/23  0958   09/18/23  0903        Albumin/Globulin Ratio     1.2         Albumin     4.0         Alkaline Phosphatase     48         ALT     19         aPTT 22.6  Comment: For Minimal Heparin Infusion, the goal aPTT 64-85 seconds corresponds to an anti-Xa of 0.3-0.5.    For Low Intensity and High  Intensity Heparin, the goal aPTT  seconds corresponds to an anti-Xa of 0.3-0.7             AST     19         Baso #     0.04         Basophil %     0.7         BILIRUBIN TOTAL     0.6         BUN     8.5         Calcium     9.7         Chloride     105         Cholesterol     160         CO2     23         Creatinine     0.81         eGFR     >60         Eos #     0.16         Eosinophil %     2.6         Globulin, Total     3.4         Glucose     97         HDL     46         Hematocrit     42.1         Hemoglobin     14.2         Immature Grans (Abs)     0.02         Immature Granulocytes     0.3         INR 0.9             LDL Cholesterol External     84.00         Lymph #     1.87         LYMPH %     30.9         Magnesium      2.10         MCH     29.5         MCHC     33.7         MCV     87.5         Mono #     0.42         Mono %     6.9         MPV     10.1         Neut #     3.55         Neut %     58.6         nRBC     0.0         Platelets     233         POCT Glucose       80       Potassium     4.2         Preg Test, Ur   Negative           PROTEIN TOTAL     7.4         Protime 12.4             RBC     4.81         RDW     13.0         Sodium     135         Thyroid Stimulating Hormone     2.544         Total Cholesterol/HDL Ratio     3         Triglycerides     148         Very Low Density Lipoprotein     30         WBC     6.06                 Significant Imaging: I have reviewed all pertinent imaging results/findings within the past 24 hours.    Assessment and Plan:     Stroke  Stroke    Needs atypical stroke workup  Continue stroke workup  Will add CTA head and neck  Awaiting ECHO (will need bubble study)  Nonsmoker  On oral contraceptives     MRI Brain: acute left frontal infarct as well as an old left cerebellar infarct   CT head negative    Ok to eat if she passes her Mary  Will continue to follow   Currently on aspirin 325 mg         VTE Risk Mitigation (From admission, onward)            Ordered     IP VTE HIGH RISK PATIENT  Once         09/18/23 1528     Place sequential compression device  Until discontinued         09/18/23 1528                    Thank you for your consult.  Further recommendations to follow from MD Lisa Springer, NP  Neurology  [facility] - Emergency Dept    I have seen/examined the patient.  NP was scribe.  I agree with the findings unless outlined below:    César Ngo MD  [facility]     Mental Status: Alert and oriented x3. Language is fluent with good comprehension.    Cranial Nerve: Pupils are equal, round, and reactive to light. Visual fields are intact to confrontation. Normal fundi. Ocular movements are intact. Face is symmetric at rest and with activation with intact sensation throughout. Hearing intact to finger rub bilaterally. Muscles of tongue and palate activate symmetrically. No dysarthria. Strength is full in sternocleidomastoid and trapezius bilaterally.    Motor: Muscle bulk and tone are normal. Strength is 5/5 in all four extremities both proximally and distally. Intact fine motor movements bilaterally. There is no pronator drift or satelliting on arm roll.    Sensory: Sensation is intact to light touch, pinprick, vibration, and proprioception throughout. Romberg is negative.    Reflexes: 2+ and symmetric at the biceps, triceps, brachioradialis, patella, and Achilles bilaterally. Plantar response is flexor bilaterally.    Coordination: No dysmetria on finger-nose-finger or heel-knee-shin. Normal rapid alternating movements. Fast finger tapping with normal amplitude and speed.    Gait: Narrow based with normal stride length and good arm swing bilaterally. Able to walk on heels, toes, and in tandem.      Hx verified    Imaging reviewed    I think the stroke is 2.2 combination of OCP and PFO  Recc stopping OCP  Start ASA/statin per guidelines and followup with cardiology  Poss sleep study as outpatient  Hypercoag labs  drawn    No further recs   Signing off

## 2023-09-19 LAB
ALBUMIN SERPL-MCNC: 3.7 G/DL (ref 3.5–5)
ALBUMIN/GLOB SERPL: 1.2 RATIO (ref 1.1–2)
ALP SERPL-CCNC: 45 UNIT/L (ref 40–150)
ALT SERPL-CCNC: 21 UNIT/L (ref 0–55)
AORTIC ROOT ANNULUS: 3.4 CM
AORTIC VALVE CUSP SEPERATION: 1.9 CM
APTT PPP: 24.9 SECONDS (ref 23.2–33.7)
AST SERPL-CCNC: 21 UNIT/L (ref 5–34)
AV INDEX (PROSTH): 0.6
AV MEAN GRADIENT: 2 MMHG
AV PEAK GRADIENT: 4 MMHG
AV VALVE AREA BY VELOCITY RATIO: 2.09 CM²
AV VALVE AREA: 1.9 CM²
AV VELOCITY RATIO: 0.67
BASOPHILS # BLD AUTO: 0.05 X10(3)/MCL
BASOPHILS NFR BLD AUTO: 0.8 %
BILIRUB SERPL-MCNC: 0.4 MG/DL
BUN SERPL-MCNC: 13.5 MG/DL (ref 7–18.7)
CALCIUM SERPL-MCNC: 9.3 MG/DL (ref 8.4–10.2)
CHLORIDE SERPL-SCNC: 108 MMOL/L (ref 98–107)
CK MB SERPL-MCNC: <1 NG/ML
CO2 SERPL-SCNC: 25 MMOL/L (ref 22–29)
CREAT SERPL-MCNC: 0.88 MG/DL (ref 0.55–1.02)
CV ECHO LV RWT: 0.45 CM
DOP CALC AO PEAK VEL: 1.05 M/S
DOP CALC AO VTI: 21.5 CM
DOP CALC LVOT AREA: 3.1 CM2
DOP CALC LVOT DIAMETER: 2 CM
DOP CALC LVOT PEAK VEL: 0.7 M/S
DOP CALC LVOT STROKE VOLUME: 40.82 CM3
DOP CALC MV VTI: 22.2 CM
DOP CALCLVOT PEAK VEL VTI: 13 CM
E WAVE DECELERATION TIME: 235 MSEC
E/A RATIO: 0.88
E/E' RATIO: 5.11 M/S
ECHO LV POSTERIOR WALL: 1.11 CM (ref 0.6–1.1)
EOSINOPHIL # BLD AUTO: 0.38 X10(3)/MCL (ref 0–0.9)
EOSINOPHIL NFR BLD AUTO: 6.5 %
ERYTHROCYTE [DISTWIDTH] IN BLOOD BY AUTOMATED COUNT: 13.1 % (ref 11.5–17)
EST. AVERAGE GLUCOSE BLD GHB EST-MCNC: 102.5 MG/DL
FRACTIONAL SHORTENING: 28 % (ref 28–44)
GFR SERPLBLD CREATININE-BSD FMLA CKD-EPI: >60 MLS/MIN/1.73/M2
GLOBULIN SER-MCNC: 3.1 GM/DL (ref 2.4–3.5)
GLUCOSE SERPL-MCNC: 100 MG/DL (ref 74–100)
HBA1C MFR BLD: 5.2 %
HCT VFR BLD AUTO: 39.9 % (ref 37–47)
HGB BLD-MCNC: 13.4 G/DL (ref 12–16)
IMM GRANULOCYTES # BLD AUTO: 0.02 X10(3)/MCL (ref 0–0.04)
IMM GRANULOCYTES NFR BLD AUTO: 0.3 %
INR PPP: 1
INTERVENTRICULAR SEPTUM: 0.81 CM (ref 0.6–1.1)
LEFT CCA DIST DIAS: 16 CM/S
LEFT CCA DIST SYS: 97 CM/S
LEFT CCA PROX DIAS: 20 CM/S
LEFT CCA PROX SYS: 101 CM/S
LEFT ECA DIAS: 15 CM/S
LEFT ECA SYS: 81 CM/S
LEFT ICA DIST DIAS: 50 CM/S
LEFT ICA DIST SYS: 104 CM/S
LEFT ICA MID DIAS: 33 CM/S
LEFT ICA MID SYS: 84 CM/S
LEFT ICA PROX DIAS: 24 CM/S
LEFT ICA PROX SYS: 92 CM/S
LEFT INTERNAL DIMENSION IN SYSTOLE: 3.5 CM (ref 2.1–4)
LEFT VENTRICLE DIASTOLIC VOLUME: 112 ML
LEFT VENTRICLE SYSTOLIC VOLUME: 50.9 ML
LEFT VENTRICULAR INTERNAL DIMENSION IN DIASTOLE: 4.89 CM (ref 3.5–6)
LEFT VENTRICULAR MASS: 166.08 G
LEFT VERTEBRAL DIAS: 15 CM/S
LEFT VERTEBRAL SYS: 61 CM/S
LV LATERAL E/E' RATIO: 4.6 M/S
LV SEPTAL E/E' RATIO: 5.75 M/S
LVOT MG: 1 MMHG
LVOT MV: 0.44 CM/S
LYMPHOCYTES # BLD AUTO: 2.8 X10(3)/MCL (ref 0.6–4.6)
LYMPHOCYTES NFR BLD AUTO: 47.5 %
MAGNESIUM SERPL-MCNC: 2.1 MG/DL (ref 1.6–2.6)
MCH RBC QN AUTO: 29.5 PG (ref 27–31)
MCHC RBC AUTO-ENTMCNC: 33.6 G/DL (ref 33–36)
MCV RBC AUTO: 87.9 FL (ref 80–94)
MONOCYTES # BLD AUTO: 0.66 X10(3)/MCL (ref 0.1–1.3)
MONOCYTES NFR BLD AUTO: 11.2 %
MV MEAN GRADIENT: 1 MMHG
MV PEAK A VEL: 0.52 M/S
MV PEAK E VEL: 0.46 M/S
MV PEAK GRADIENT: 2 MMHG
MV STENOSIS PRESSURE HALF TIME: 58 MS
MV VALVE AREA BY CONTINUITY EQUATION: 1.84 CM2
MV VALVE AREA P 1/2 METHOD: 3.79 CM2
NEUTROPHILS # BLD AUTO: 1.98 X10(3)/MCL (ref 2.1–9.2)
NEUTROPHILS NFR BLD AUTO: 33.7 %
NRBC BLD AUTO-RTO: 0 %
OHS CV CAROTID RIGHT ICA EDV HIGHEST: 31
OHS CV CAROTID ULTRASOUND LEFT ICA/CCA RATIO: 1.07
OHS CV CAROTID ULTRASOUND RIGHT ICA/CCA RATIO: 0.95
OHS CV PV CAROTID LEFT HIGHEST CCA: 101
OHS CV PV CAROTID LEFT HIGHEST ICA: 104
OHS CV PV CAROTID RIGHT HIGHEST CCA: 98
OHS CV PV CAROTID RIGHT HIGHEST ICA: 93
OHS CV US CAROTID LEFT HIGHEST EDV: 50
OHS LV EJECTION FRACTION SIMPSONS BIPLANE MOD: 57 %
PHOSPHATE SERPL-MCNC: 4.7 MG/DL (ref 2.3–4.7)
PISA MRMAX VEL: 2.94 M/S
PISA TR MAX VEL: 1.99 M/S
PLATELET # BLD AUTO: 251 X10(3)/MCL (ref 130–400)
PMV BLD AUTO: 10.8 FL (ref 7.4–10.4)
POTASSIUM SERPL-SCNC: 4.6 MMOL/L (ref 3.5–5.1)
PROT SERPL-MCNC: 6.8 GM/DL (ref 6.4–8.3)
PROTHROMBIN TIME: 12.7 SECONDS (ref 12.5–14.5)
PV PEAK GRADIENT: 4 MMHG
PV PEAK VELOCITY: 1.06 M/S
RA MAJOR: 4.02 CM
RA PRESSURE ESTIMATED: 3 MMHG
RA WIDTH: 3.49 CM
RBC # BLD AUTO: 4.54 X10(6)/MCL (ref 4.2–5.4)
RIGHT CCA DIST DIAS: 18 CM/S
RIGHT CCA DIST SYS: 98 CM/S
RIGHT CCA PROX DIAS: 10 CM/S
RIGHT CCA PROX SYS: 75 CM/S
RIGHT ECA DIAS: 3 CM/S
RIGHT ECA SYS: 67 CM/S
RIGHT ICA DIST DIAS: 18 CM/S
RIGHT ICA DIST SYS: 69 CM/S
RIGHT ICA MID DIAS: 31 CM/S
RIGHT ICA MID SYS: 93 CM/S
RIGHT ICA PROX DIAS: 16 CM/S
RIGHT ICA PROX SYS: 69 CM/S
RIGHT VERTEBRAL DIAS: 17 CM/S
RIGHT VERTEBRAL SYS: 60 CM/S
RV MID DIAMA: 2.59 CM
RV TB RVSP: 5 MMHG
SODIUM SERPL-SCNC: 141 MMOL/L (ref 136–145)
TDI LATERAL: 0.1 M/S
TDI SEPTAL: 0.08 M/S
TDI: 0.09 M/S
TR MAX PG: 16 MMHG
TRICUSPID ANNULAR PLANE SYSTOLIC EXCURSION: 1.69 CM
TROPONIN I SERPL-MCNC: <0.01 NG/ML (ref 0–0.04)
TV REST PULMONARY ARTERY PRESSURE: 19 MMHG
WBC # SPEC AUTO: 5.89 X10(3)/MCL (ref 4.5–11.5)

## 2023-09-19 PROCEDURE — 80053 COMPREHEN METABOLIC PANEL: CPT | Performed by: NURSE PRACTITIONER

## 2023-09-19 PROCEDURE — 97161 PT EVAL LOW COMPLEX 20 MIN: CPT

## 2023-09-19 PROCEDURE — 84100 ASSAY OF PHOSPHORUS: CPT | Performed by: NURSE PRACTITIONER

## 2023-09-19 PROCEDURE — 83036 HEMOGLOBIN GLYCOSYLATED A1C: CPT | Performed by: NURSE PRACTITIONER

## 2023-09-19 PROCEDURE — 83735 ASSAY OF MAGNESIUM: CPT | Performed by: NURSE PRACTITIONER

## 2023-09-19 PROCEDURE — 84484 ASSAY OF TROPONIN QUANT: CPT | Performed by: NURSE PRACTITIONER

## 2023-09-19 PROCEDURE — 85730 THROMBOPLASTIN TIME PARTIAL: CPT | Performed by: NURSE PRACTITIONER

## 2023-09-19 PROCEDURE — 85610 PROTHROMBIN TIME: CPT | Performed by: NURSE PRACTITIONER

## 2023-09-19 PROCEDURE — 97165 OT EVAL LOW COMPLEX 30 MIN: CPT

## 2023-09-19 PROCEDURE — 82553 CREATINE MB FRACTION: CPT | Performed by: NURSE PRACTITIONER

## 2023-09-19 PROCEDURE — 92523 SPEECH SOUND LANG COMPREHEN: CPT

## 2023-09-19 PROCEDURE — 85025 COMPLETE CBC W/AUTO DIFF WBC: CPT | Performed by: NURSE PRACTITIONER

## 2023-09-19 PROCEDURE — 25000003 PHARM REV CODE 250: Performed by: STUDENT IN AN ORGANIZED HEALTH CARE EDUCATION/TRAINING PROGRAM

## 2023-09-19 PROCEDURE — 21400001 HC TELEMETRY ROOM

## 2023-09-19 RX ORDER — ASPIRIN 325 MG
325 TABLET, DELAYED RELEASE (ENTERIC COATED) ORAL DAILY
Status: DISCONTINUED | OUTPATIENT
Start: 2023-09-20 | End: 2023-09-20 | Stop reason: HOSPADM

## 2023-09-19 RX ORDER — ATORVASTATIN CALCIUM 40 MG/1
40 TABLET, FILM COATED ORAL DAILY
Status: DISCONTINUED | OUTPATIENT
Start: 2023-09-19 | End: 2023-09-20 | Stop reason: HOSPADM

## 2023-09-19 RX ORDER — ASPIRIN 81 MG/1
81 TABLET ORAL DAILY
Status: DISCONTINUED | OUTPATIENT
Start: 2023-09-19 | End: 2023-09-19

## 2023-09-19 RX ADMIN — ATORVASTATIN CALCIUM 40 MG: 40 TABLET, FILM COATED ORAL at 10:09

## 2023-09-19 RX ADMIN — ASPIRIN 81 MG: 81 TABLET, COATED ORAL at 10:09

## 2023-09-19 NOTE — CONSULTS
Inpatient consult to Cardiology  Consult performed by: Paula Christianson FNP  Consult ordered by: Lisa Springer NP  Reason for consult: YA        [facility] - 9th Floor Med Surg    Cardiology  Consult Note    Patient Name: Sharon Shaver  MRN: [id number]  Admission Date: 9/18/2023  Hospital Length of Stay: 1 days  Code Status: Full Code   Attending Provider: Tj Young MD   Consulting Provider: MORTEZA Claire  Primary Care Physician: No, Primary Doctor  Principal Problem:<principal problem not specified>    Patient information was obtained from patient, past medical records, and ER records.     Subjective:     Reason for Consult: YA     HPI:   Ms. Shaver is a 37 year old female who is unknown to [facility]. She presents to the ER on 9.18.23 with complaints of right arm numbness that began earlier that morning. She denies CP, SOB, palpitations, nausea, vomiting, HA, slurred speech, or vision changes. She reports improvement in the numbness. 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 She has since resumed taking them since 2016. Her lab work is significant for d-dimer 0.53 and triglycerides. An MRI of the brain was obtained and demonstrated left frontal acute nonhemorrhagic lacunar infarcts and left cerebellar old lacunar infarct. A CTA head and neck was obtained and negative for large vessel occlusion and multiple thyroid nodules seen with 1 hypervascular nodule in the left thyroid. An echo was obtained and demonstrated a positive bubble study. Neuro is on board with recommendations for YA to further evaluate positive bubble study, which is the reason [facility] has been consulted.     PMH: migraines  PSH: denies  Family History: Mother - CVA; Father - PPM   Social History: Denies alcohol, tobacco, or illicit drug use.     Previous Cardiac Diagnostics:   Limited TTE 9.19.23:  Limited to repeat bubble study. Bubble study  is positive for intracardia shunt grade 3.    TTE 9.19.23:  Left Ventricle: The left ventricle is normal in size. Mildly increased wall thickness. There is normal systolic function with a visually estimated ejection fraction of 55 - 60%. There is normal diastolic function.  Right Ventricle: Normal right ventricular cavity size.  IVC/SVC: Normal venous pressure at 3 mmHg.    Review of patient's allergies indicates:   Allergen Reactions    Sulfa (sulfonamide antibiotics) Hives       Review of Systems   Respiratory:  Negative for shortness of breath.    Cardiovascular:  Negative for chest pain and palpitations.   Neurological:  Positive for numbness.       Objective:     Vital Signs (Most Recent):  Temp: 98.7 °F (37.1 °C) (09/19/23 0727)  Pulse: 75 (09/19/23 0727)  Resp: 19 (09/19/23 0727)  BP: 111/74 (09/19/23 0727)  SpO2: 100 % (09/19/23 0727) Vital Signs (24h Range):  Temp:  [98.6 °F (37 °C)-98.7 °F (37.1 °C)] 98.7 °F (37.1 °C)  Pulse:  [62-79] 75  Resp:  [16-21] 19  SpO2:  [96 %-100 %] 100 %  BP: (111-128)/(58-88) 111/74     Weight: 75.8 kg (167 lb)  There is no height or weight on file to calculate BMI.    SpO2: 100 %         Intake/Output Summary (Last 24 hours) at 9/19/2023 1055  Last data filed at 9/19/2023 0428  Gross per 24 hour   Intake 120 ml   Output --   Net 120 ml       Lines/Drains/Airways       Peripheral Intravenous Line  Duration                  Peripheral IV - Single Lumen 09/18/23 1456 20 G Right Antecubital <1 day                    Significant Labs:  Recent Results (from the past 72 hour(s))   POCT glucose    Collection Time: 09/18/23  9:03 AM   Result Value Ref Range    POCT Glucose 80 70 - 110 mg/dL   CBC with Differential    Collection Time: 09/18/23  9:58 AM   Result Value Ref Range    WBC 6.06 4.50 - 11.50 x10(3)/mcL    RBC 4.81 4.20 - 5.40 x10(6)/mcL    Hgb 14.2 12.0 - 16.0 g/dL    Hct 42.1 37.0 - 47.0 %    MCV 87.5 80.0 - 94.0 fL    MCH 29.5 27.0 - 31.0 pg    MCHC 33.7 33.0 - 36.0 g/dL     RDW 13.0 11.5 - 17.0 %    Platelet 233 130 - 400 x10(3)/mcL    MPV 10.1 7.4 - 10.4 fL    Neut % 58.6 %    Lymph % 30.9 %    Mono % 6.9 %    Eos % 2.6 %    Basophil % 0.7 %    Lymph # 1.87 0.6 - 4.6 x10(3)/mcL    Neut # 3.55 2.1 - 9.2 x10(3)/mcL    Mono # 0.42 0.1 - 1.3 x10(3)/mcL    Eos # 0.16 0 - 0.9 x10(3)/mcL    Baso # 0.04 <=0.2 x10(3)/mcL    IG# 0.02 0 - 0.04 x10(3)/mcL    IG% 0.3 %    NRBC% 0.0 %   Comprehensive Metabolic Panel    Collection Time: 09/18/23  9:58 AM   Result Value Ref Range    Sodium Level 135 (L) 136 - 145 mmol/L    Potassium Level 4.2 3.5 - 5.1 mmol/L    Chloride 105 98 - 107 mmol/L    Carbon Dioxide 23 22 - 29 mmol/L    Glucose Level 97 74 - 100 mg/dL    Blood Urea Nitrogen 8.5 7.0 - 18.7 mg/dL    Creatinine 0.81 0.55 - 1.02 mg/dL    Calcium Level Total 9.7 8.4 - 10.2 mg/dL    Protein Total 7.4 6.4 - 8.3 gm/dL    Albumin Level 4.0 3.5 - 5.0 g/dL    Globulin 3.4 2.4 - 3.5 gm/dL    Albumin/Globulin Ratio 1.2 1.1 - 2.0 ratio    Bilirubin Total 0.6 <=1.5 mg/dL    Alkaline Phosphatase 48 40 - 150 unit/L    Alanine Aminotransferase 19 0 - 55 unit/L    Aspartate Aminotransferase 19 5 - 34 unit/L    eGFR >60 mls/min/1.73/m2   Magnesium    Collection Time: 09/18/23  9:58 AM   Result Value Ref Range    Magnesium Level 2.10 1.60 - 2.60 mg/dL   Lipid Panel    Collection Time: 09/18/23  9:58 AM   Result Value Ref Range    Cholesterol Total 160 <=200 mg/dL    HDL Cholesterol 46 35 - 60 mg/dL    Triglyceride 148 (H) 37 - 140 mg/dL    Cholesterol/HDL Ratio 3 0 - 5    Very Low Density Lipoprotein 30     LDL Cholesterol 84.00 50.00 - 140.00 mg/dL   TSH    Collection Time: 09/18/23  9:58 AM   Result Value Ref Range    Thyroid Stimulating Hormone 2.544 0.350 - 4.940 uIU/mL   C-reactive protein    Collection Time: 09/18/23  9:58 AM   Result Value Ref Range    C-Reactive Protein <1.00 <5.00 mg/L   Pregnancy, urine rapid    Collection Time: 09/18/23 11:13 AM   Result Value Ref Range    Beta hCG Qualitative, Urine  Negative Negative   Protime-INR    Collection Time: 09/18/23  3:05 PM   Result Value Ref Range    PT 12.4 (L) 12.5 - 14.5 seconds    INR 0.9 <=1.3   APTT    Collection Time: 09/18/23  3:05 PM   Result Value Ref Range    PTT 22.6 (L) 23.2 - 33.7 seconds   Fibrinogen    Collection Time: 09/18/23  3:05 PM   Result Value Ref Range    Fibrinogen 300.0 210.0 - 463.0 mg/dL   D-dimer, quantitative    Collection Time: 09/18/23  3:05 PM   Result Value Ref Range    D-Dimer 0.53 (H) 0.00 - 0.50 ug/mL FEU   Homocysteine, serum    Collection Time: 09/18/23  5:05 PM   Result Value Ref Range    Homocysteine Total 6.2 5.1 - 15.4 umol/L   Factor 8 assay    Collection Time: 09/18/23  5:05 PM   Result Value Ref Range    Factor VIII 212 (H) 59 - 191 %   CV Ultrasound Bilateral Doppler Carotid    Collection Time: 09/18/23  8:13 PM   Result Value Ref Range    Right CCA prox sys 75 cm/s    Right CCA prox mccall 10 cm/s    Right CCA dist sys 98 cm/s    Right CCA dist mccall 18 cm/s    Right ICA prox sys 69 cm/s    Right ICA prox mccall 16 cm/s    Right ICA mid sys 93 cm/s    Right ICA mid mccall 31 cm/s    Right ICA dist sys 69 cm/s    Right ICA dist mccall 18 cm/s    Right ECA sys 67 cm/s    Right ECA mccall 3 cm/s    Right vertebral sys 60 cm/s    Right vertebral mccall 17 cm/s    Right ICA/CCA ratio 0.95     Right Highest ICA 93.00     Right Highest EDV 31.00     Right Highest CCA 98     Left CCA prox sys 101 cm/s    Left CCA prox mccall 20 cm/s    Left CCA dist sys 97 cm/s    Left CCA dist mccall 16 cm/s    Left ICA prox sys 92 cm/s    Left ICA prox mccall 24 cm/s    Left ICA mid sys 84 cm/s    Left ICA mid mccall 33 cm/s    Left ICA dist sys 104 cm/s    Left ICA dist mccall 50 cm/s    Left ECA sys 81 cm/s    Left ECA mccall 15 cm/s    Left vertebral sys 61 cm/s    Left vertebral mccall 15 cm/s    Left ICA/CCA ratio 1.07     Left Highest .00     LT Highest EDV 50.00     Left Highest     Echo    Collection Time: 09/18/23  8:17 PM   Result Value Ref  Range    Ron's Biplane MOD Ejection Fraction 57 %    LVOT stroke volume 40.82 cm3    LVIDd 4.89 3.5 - 6.0 cm    LV Systolic Volume 50.90 mL    LVIDs 3.50 2.1 - 4.0 cm    LV Diastolic Volume 112.00 mL    IVS 0.81 0.6 - 1.1 cm    LVOT diameter 2.00 cm    LVOT area 3.1 cm2    FS 28 28 - 44 %    Left Ventricle Relative Wall Thickness 0.45 cm    Posterior Wall 1.11 (A) 0.6 - 1.1 cm    LV mass 166.08 g    MV Peak E Ernesto 0.46 m/s    TDI LATERAL 0.10 m/s    TDI SEPTAL 0.08 m/s    E/E' ratio 5.11 m/s    MV Peak A Ernesto 0.52 m/s    TR Max Ernesto 1.99 m/s    E/A ratio 0.88     E wave deceleration time 235.00 msec    LV SEPTAL E/E' RATIO 5.75 m/s    LV LATERAL E/E' RATIO 4.60 m/s    LVOT peak ernesto 0.70 m/s    Left Ventricular Outflow Tract Mean Velocity 0.44 cm/s    Left Ventricular Outflow Tract Mean Gradient 1.00 mmHg    RV mid diameter 2.59 cm    TAPSE 1.69 cm    RA Major Axis 4.02 cm    RA Width 3.49 cm    AV mean gradient 2 mmHg    AV peak gradient 4 mmHg    Ao peak ernesto 1.05 m/s    Ao VTI 21.50 cm    LVOT peak VTI 13.00 cm    AV valve area 1.90 cm²    AV Velocity Ratio 0.67     AV index (prosthetic) 0.60     MEL by Velocity Ratio 2.09 cm²    Mr max ernesto 2.94 m/s    MV mean gradient 1 mmHg    MV peak gradient 2 mmHg    MV stenosis pressure 1/2 time 58.00 ms    MV valve area p 1/2 method 3.79 cm2    MV valve area by continuity eq 1.84 cm2    MV VTI 22.2 cm    Triscuspid Valve Regurgitation Peak Gradient 16 mmHg    PV PEAK VELOCITY 1.06 m/s    PV peak gradient 4 mmHg    Ao root annulus 3.40 cm    Mean e' 0.09 m/s    AORTIC VALVE CUSP SEPERATION 1.90 cm    TV resting pulmonary artery pressure 19 mmHg    RV TB RVSP 5 mmHg    Est. RA pres 3 mmHg   Comprehensive metabolic panel    Collection Time: 09/19/23  3:45 AM   Result Value Ref Range    Sodium Level 141 136 - 145 mmol/L    Potassium Level 4.6 3.5 - 5.1 mmol/L    Chloride 108 (H) 98 - 107 mmol/L    Carbon Dioxide 25 22 - 29 mmol/L    Glucose Level 100 74 - 100 mg/dL    Blood  Urea Nitrogen 13.5 7.0 - 18.7 mg/dL    Creatinine 0.88 0.55 - 1.02 mg/dL    Calcium Level Total 9.3 8.4 - 10.2 mg/dL    Protein Total 6.8 6.4 - 8.3 gm/dL    Albumin Level 3.7 3.5 - 5.0 g/dL    Globulin 3.1 2.4 - 3.5 gm/dL    Albumin/Globulin Ratio 1.2 1.1 - 2.0 ratio    Bilirubin Total 0.4 <=1.5 mg/dL    Alkaline Phosphatase 45 40 - 150 unit/L    Alanine Aminotransferase 21 0 - 55 unit/L    Aspartate Aminotransferase 21 5 - 34 unit/L    eGFR >60 mls/min/1.73/m2   Magnesium    Collection Time: 09/19/23  3:45 AM   Result Value Ref Range    Magnesium Level 2.10 1.60 - 2.60 mg/dL   Phosphorus    Collection Time: 09/19/23  3:45 AM   Result Value Ref Range    Phosphorus Level 4.7 2.3 - 4.7 mg/dL   Troponin I    Collection Time: 09/19/23  3:45 AM   Result Value Ref Range    Troponin-I <0.010 0.000 - 0.045 ng/mL   CK-MB    Collection Time: 09/19/23  3:45 AM   Result Value Ref Range    Creatine Kinase MB <1.0 <=3.4 ng/mL   APTT    Collection Time: 09/19/23  3:45 AM   Result Value Ref Range    PTT 24.9 23.2 - 33.7 seconds   Protime-INR    Collection Time: 09/19/23  3:45 AM   Result Value Ref Range    PT 12.7 12.5 - 14.5 seconds    INR 1.0 <=1.3   Hemoglobin A1C    Collection Time: 09/19/23  3:45 AM   Result Value Ref Range    Hemoglobin A1c 5.2 <=7.0 %    Estimated Average Glucose 102.5 mg/dL   CBC with Differential    Collection Time: 09/19/23  3:45 AM   Result Value Ref Range    WBC 5.89 4.50 - 11.50 x10(3)/mcL    RBC 4.54 4.20 - 5.40 x10(6)/mcL    Hgb 13.4 12.0 - 16.0 g/dL    Hct 39.9 37.0 - 47.0 %    MCV 87.9 80.0 - 94.0 fL    MCH 29.5 27.0 - 31.0 pg    MCHC 33.6 33.0 - 36.0 g/dL    RDW 13.1 11.5 - 17.0 %    Platelet 251 130 - 400 x10(3)/mcL    MPV 10.8 (H) 7.4 - 10.4 fL    Neut % 33.7 %    Lymph % 47.5 %    Mono % 11.2 %    Eos % 6.5 %    Basophil % 0.8 %    Lymph # 2.80 0.6 - 4.6 x10(3)/mcL    Neut # 1.98 (L) 2.1 - 9.2 x10(3)/mcL    Mono # 0.66 0.1 - 1.3 x10(3)/mcL    Eos # 0.38 0 - 0.9 x10(3)/mcL    Baso # 0.05 <=0.2  x10(3)/mcL    IG# 0.02 0 - 0.04 x10(3)/mcL    IG% 0.3 %    NRBC% 0.0 %       Significant Imaging:  Imaging Results              CTA Head and Neck (xpd) (Final result)  Result time 09/18/23 17:56:29      Final result by Rachael Chery MD (09/18/23 17:56:29)                   Impression:        No evidence large vessel occlusion seen    Multiple thyroid nodules seen with 1 hypervascular nodule seen in the left thyroid.  Ultrasound correlation is recommended      Electronically signed by: Rachael Chery  Date:    09/18/2023  Time:    17:56               Narrative:    EXAMINATION:  CTA HEAD AND NECK (XPD)    CLINICAL HISTORY:  Stroke/TIA, determine embolic source;    TECHNIQUE:  Non contrast low dose axial images were obtained through the head.  CT angiogram was performed from the level of the nnamdi to the top of the head following the IV administration 100 cc of Isovue 370 contrast .  Sagittal and coronal reconstructions and maximum intensity projection reconstructions were performed. Arterial stenosis percentages are based on NASCET measurement criteria.  Additional multiplanar reconstructions were performed on post processed imaging.  Automatic exposure control (AEC) is utilized to reduce patient radiation exposure.    COMPARISON:  None    FINDINGS:  Source images: No intracranial mass lesion is seen.  No hemorrhage is seen.  No infarct is seen.  Ventricles and basilar cisterns appear grossly unremarkable.  No cervical mass or lesion is seen.  No abnormal lymphadenopathy seen.  The thyroid is heterogeneous with areas of nodularity in the thyroid and a very hypervascular nodule seen in the left thyroid.  Thyroid ultrasound is recommended.  The hypervascular nodule measures 1 cm by 9 mm..  Larynx appears normal.  Trachea is midline.  Thoracic inlet appears normal.    Vascular images:    The aortic arch is normal in caliber.  Proximal great vessels are widely patent.  No significant plaque is seen.    The  common carotid arteries are widely patent.  No significant plaque or stenosis is seen.    The carotid bulbs have no significant plaque or stenosis.  No calcification is seen.    The internal carotid arteries are widely patent.  No areas of stenosis or plaque is seen.  Internal carotid arteries are seen to level the petrous ridge and clinoid and supraclinoid portions.  They are free of plaque.    The MCAs are patent.  M1 segments, M2 segments M3 segments are patent.  No aneurysm or obstruction is seen.    A1 segments are patent.  Anterior communicating arteries patent.  Anterior cerebral arteries are widely patent with no evidence of aneurysm or obstruction seen.    Both vertebral arteries are widely patent.  They are normal in caliber.  No stenosis is seen.  No dissection is seen.  Both vertebral arteries perfuse a normal appearing basilar artery.  Basilar artery shows no evidence of occlusion or aneurysm.  The posterior cerebral artery is patent the right and left side.  There is persistent fetal circulation of the left posterior cerebral artery which is a normal variant.    .                                       MRI Brain Without Contrast (Final result)  Result time 09/18/23 14:36:41      Final result by Zaheer Amezquita MD (09/18/23 14:36:41)                   Impression:      1.  Left frontal lobe acute nonhemorrhagic lacunar infarcts.    2.  Left cerebellar old lacunar infarct and very minimal chronic microvascular ischemia.      Electronically signed by: Zaheer Amezquita  Date:    09/18/2023  Time:    14:36               Narrative:    EXAMINATION:  MRI BRAIN WITHOUT CONTRAST    CLINICAL HISTORY:  Transient ischemic attack (TIA);    TECHNIQUE:  Multiplanar MRI sequences were performed of the brain without contrast.    COMPARISON:  CT of the brain September 18, 2023    FINDINGS:  There are acute nonhemorrhagic lacunar infarcts involving the left frontal lobe with diffusion weighted restrictions and ADC map dark  signal on images 10 and 8 series 4.  There are also associated T2 FLAIR hyperintensities.  There is left cerebellar old lacunar infarct.  Right periventricular punctate T2 FLAIR hyperintensity is consistent with very minimal chronic ischemia. Gradient echo sequences demonstrate no evidence of de phasing artifact to suggest hemorrhagic byproducts. No evidence of diffusion restriction or ADC map signal drop out to suggest acute infarct. The sella and suprasellar areas are unremarkable.    The cerebellar tonsils are normally positioned. There is no acute intracranial hemorrhage, hydrocephalus, midline shift or mass effect. No acute extra axial fluid collections identified. The mastoid air cells are clear.                                       CT Head Without Contrast (Final result)  Result time 09/18/23 12:51:34      Final result by Semaj Gaona MD (09/18/23 12:51:34)                   Impression:      No acute intracranial abnormality identified.  Findings of mild microvascular ischemic disease.      Electronically signed by: Semaj Gaona  Date:    09/18/2023  Time:    12:51               Narrative:    EXAMINATION:  CT HEAD WITHOUT CONTRAST    CLINICAL HISTORY:  Transient ischemic attack (TIA);    TECHNIQUE:  Low dose axial images were obtained through the head.  Coronal and sagittal reformations were also performed. Contrast was not administered.    Automatic exposure control was utilized to reduce the patient's radiation dose.    DLP= 954    COMPARISON:  None.    FINDINGS:  No acute intracranial hemorrhage, edema or mass. No acute parenchymal abnormality.    Mild cerebral atrophy with concordant ventricular enlargement.    There is normal gray white differentiation.    The osseous structures are normal.    The mastoid air cells are clear.    The auditory canals are patent bilaterally.    The globes and orbital contents are normal bilaterally.    The visualized maxillary, ethmoid and sphenoid sinuses are  clear.                                      EKG:  none to review    Telemetry:  SR    Physical Exam  HENT:      Head: Normocephalic.      Nose: Nose normal.      Mouth/Throat:      Mouth: Mucous membranes are moist.   Eyes:      Extraocular Movements: Extraocular movements intact.   Cardiovascular:      Rate and Rhythm: Normal rate and regular rhythm.      Pulses: Normal pulses.      Heart sounds: Normal heart sounds.   Pulmonary:      Effort: Pulmonary effort is normal.      Breath sounds: Normal breath sounds.   Abdominal:      Palpations: Abdomen is soft.   Skin:     General: Skin is warm and dry.   Neurological:      Mental Status: She is alert and oriented to person, place, and time.      Comments: Numbness to RUE   Psychiatric:         Behavior: Behavior normal.         Home Medications:   No current facility-administered medications on file prior to encounter.     No current outpatient medications on file prior to encounter.       Current Inpatient Medications:    Current Facility-Administered Medications:     aspirin EC tablet 81 mg, 81 mg, Oral, Daily, Tj Young MD, 81 mg at 09/19/23 1008    atorvastatin tablet 40 mg, 40 mg, Oral, Daily, Tj Young MD, 40 mg at 09/19/23 1008    labetaloL injection 10 mg, 10 mg, Intravenous, Q6H PRN, Monique Tamez, AGACNP-BC         VTE Risk Mitigation (From admission, onward)           Ordered     IP VTE HIGH RISK PATIENT  Once         09/18/23 1528     Place sequential compression device  Until discontinued         09/18/23 1528                    Assessment:   Acute Left Frontal Lobe Nonhemorrhagic CVA  Left Cerebellar Old Lacunar Infarct  + Bubble Study  Menstrual Cycle Related Migraines    - OCP Use    Plan:   Continue ASA & statin per neuro recommendations.  Will plan for YA tomorrow to evaluate + bubble study.   R/B/A discussed with the patient and she agrees to proceed.  Signed consent obtained and placed on the chart.  Keep NPO after midnight.    Procedure has been scheduled.     Thank you for your consult.     MORTEZA Claire  Cardiology  [facility] - 9th Floor Med Surg  09/19/2023 10:55 AM     Physician addendum:  I have seen and examined this patient as a split-shared visit with the BARBARA d/t complicated medical management of above problems written in assessment and high acuity requiring physician expertise in medical decision-making. I performed the substantive portion of the history and exam. Above medical decision-making is also formulated by me.    Cardiovascular exam:  S1, S2  Lungs:  Fine crackles at bases.  Extremities:  1+ Edema bilaterally    Plan:  YA tomorrow.  Discussed in detail with the patient and her .      Beto Taylor MD  Cardiologist

## 2023-09-19 NOTE — PROGRESS NOTES
[facility] - 9th Floor Med Surg  Neurology  Progress Note    Patient Name: Sharon Shavre  MRN: [id number]  Admission Date: 9/18/2023  Hospital Length of Stay: 1 days  Code Status: Full Code   Attending Provider: Tj Young MD  Primary Care Physician: No, Primary Doctor   Principal Problem:<principal problem not specified>    Overview/Hospital Course:  9/18 right arm numbness 7:30 am, came to ED, symptoms improving but not resolved, out of the window for TNK, MRI brain significant for acute left frontal infarct and old left cerebellar infarct         Subjective:     Interval History:   No new issues, remains with mild right sided numbness,  at bedside, echo tech back at bedside to repeat BS, reported positive BS.     Current Neurological Medications:     Current Facility-Administered Medications   Medication Dose Route Frequency Provider Last Rate Last Admin    labetaloL injection 10 mg  10 mg Intravenous Q6H PRN Monique Tamez, AGACNP-BC           Review of Systems   Neurological:  Positive for numbness. Negative for dizziness, tremors, seizures, syncope, facial asymmetry, speech difficulty, weakness, light-headedness and headaches.   All other systems reviewed and are negative.    Objective:     Vital Signs (Most Recent):  Temp: 98.7 °F (37.1 °C) (09/19/23 0727)  Pulse: 75 (09/19/23 0727)  Resp: 19 (09/19/23 0727)  BP: 111/74 (09/19/23 0727)  SpO2: 100 % (09/19/23 0727) Vital Signs (24h Range):  Temp:  [98.6 °F (37 °C)-98.7 °F (37.1 °C)] 98.7 °F (37.1 °C)  Pulse:  [62-79] 75  Resp:  [16-21] 19  SpO2:  [96 %-100 %] 100 %  BP: (111-128)/(58-88) 111/74     Weight: 75.8 kg (167 lb)  There is no height or weight on file to calculate BMI.     Physical Exam  Vitals and nursing note reviewed.   Constitutional:       General: She is not in acute distress.     Appearance: Normal appearance. She is not toxic-appearing.   HENT:      Head: Normocephalic.   Eyes:      General: No visual field deficit.      Extraocular Movements:      Right eye: Normal extraocular motion and no nystagmus.      Left eye: Normal extraocular motion and no nystagmus.      Pupils: Pupils are equal, round, and reactive to light.   Cardiovascular:      Rate and Rhythm: Normal rate.   Pulmonary:      Effort: Pulmonary effort is normal.      Breath sounds: Normal breath sounds.   Musculoskeletal:         General: No swelling. Normal range of motion.      Cervical back: Normal range of motion.      Right lower leg: No edema.      Left lower leg: No edema.   Skin:     General: Skin is warm and dry.   Neurological:      General: No focal deficit present.      Mental Status: She is alert and oriented to person, place, and time.      Cranial Nerves: No dysarthria or facial asymmetry.      Sensory: Sensory deficit (RUE numbness) present.      Motor: Motor function is intact. No weakness, tremor, atrophy, abnormal muscle tone, seizure activity or pronator drift.      Coordination: Coordination normal. Finger-Nose-Finger Test normal.   Psychiatric:         Attention and Perception: Attention normal.         Mood and Affect: Affect normal.         Speech: Speech normal.         Behavior: Behavior normal.          NEUROLOGICAL EXAMINATION:     MENTAL STATUS   Oriented to person, place, and time.   Speech: speech is normal     CRANIAL NERVES     CN III, IV, VI   Pupils are equal, round, and reactive to light.    GAIT AND COORDINATION      Coordination   Finger to nose coordination: normal      Significant Labs:   Recent Lab Results  (Last 5 results in the past 24 hours)        09/19/23  0345   09/18/23 2017 09/18/23  1705   09/18/23  1505   09/18/23  1113        Albumin/Globulin Ratio 1.2               Albumin 3.7               Alkaline Phosphatase 45               ALT 21               Ao root annulus   3.40             Ao peak flako   1.05             Ao VTI   21.50             aPTT 24.9  Comment: For Minimal Heparin Infusion, the goal aPTT 64-85  seconds corresponds to an anti-Xa of 0.3-0.5.    For Low Intensity and High Intensity Heparin, the goal aPTT  seconds corresponds to an anti-Xa of 0.3-0.7       22.6  Comment: For Minimal Heparin Infusion, the goal aPTT 64-85 seconds corresponds to an anti-Xa of 0.3-0.5.    For Low Intensity and High Intensity Heparin, the goal aPTT  seconds corresponds to an anti-Xa of 0.3-0.7         AST 21               AV valve area   1.90             MEL by Velocity Ratio   2.09             AORTIC VALVE CUSP SEPERATION   1.90             AV mean gradient   2             AV index (prosthetic)   0.60             AV peak gradient   4             AV Velocity Ratio   0.67             Baso # 0.05               Basophil % 0.8               BILIRUBIN TOTAL 0.4               BUN 13.5               Calcium 9.3               Chloride 108               CO2 25               CPK MB <1.0               Creatinine 0.88               Left Ventricle Relative Wall Thickness   0.45             D-Dimer       0.53  Comment: D-dimer values of less than 0.5ug/mL FEU in adult patients with a clinically low pre-test probability of developing a DVT yield  a 99% negative predictive value.  D-dimer increases naturally with age, therefore the negative predictive value in patients older than 80 is 21 - 31%.    D-Dimer testing at [facility] and [facility] is used as an aid in the diagnosis of VTE/PE. The D-Dimer test must be used with one or more additional tests such as imaging studies to evaluate VTE/PE         E/A ratio   0.88             E/E' ratio   5.11             eGFR >60               Eos # 0.38               Eosinophil % 6.5               Estimated Avg Glucose 102.5               E wave deceleration time   235.00             Factor VIII     212           Fibrinogen       300.0         FS   28             Globulin, Total 3.1               Glucose 100               Hematocrit 39.9                Hemoglobin 13.4               Hemoglobin A1C External 5.2               Homocysteine     6.2           Immature Grans (Abs) 0.02               Immature Granulocytes 0.3               INR 1.0       0.9         IVSd   0.81             LVOT area   3.1             LV LATERAL E/E' RATIO   4.60             LV SEPTAL E/E' RATIO   5.75             LV EDV BP   112.00             LVIDd   4.89             LVIDs   3.50             LV mass   166.08             Left Ventricular Outflow Tract Mean Gradient   1.00             Left Ventricular Outflow Tract Mean Velocity   0.44             LVOT diameter   2.00             LVOT peak ernesto   0.70             LVOT stroke volume   40.82             LVOT peak VTI   13.00             LV ESV BP   50.90             Lymph # 2.80               LYMPH % 47.5               Magnesium  2.10               MCH 29.5               MCHC 33.6               MCV 87.9               Mean e'   0.09             Mono # 0.66               Mono % 11.2               MPV 10.8               Mr max ernesto   2.94             MV valve area p 1/2 method   3.79             MV valve area by continuity eq   1.84             MV mean gradient   1             MV peak gradient   2             MV Peak A Ernesto   0.52             MV Peak E Ernesto   0.46             MV stenosis pressure 1/2 time   58.00             MV VTI   22.2             Neut # 1.98               Neut % 33.7               nRBC 0.0               Ron's Biplane MOD Ejection Fraction   57             Phosphorus 4.7               Platelets 251               Potassium 4.6               Preg Test, Ur         Negative       PROTEIN TOTAL 6.8               Protime 12.7       12.4         PV peak gradient   4             PV PEAK VELOCITY   1.06             Posterior Wall   1.11             RA Major Axis   4.02             Est. RA pres   3             RA Width   3.49             RBC 4.54               RDW 13.1               RV mid diameter   2.59             RV TB RVSP   5              Sodium 141               TAPSE   1.69             TDI SEPTAL   0.08             TDI LATERAL   0.10             Triscuspid Valve Regurgitation Peak Gradient   16             TR Max Ernesto   1.99             Troponin I <0.010               TV resting pulmonary artery pressure   19             WBC 5.89                                      Significant Imaging: I have reviewed and interpreted all pertinent imaging results/findings within the past 24 hours.    Assessment and Plan:     Stroke  Stroke      Will consult cardiology about positive bubble study   Nonsmoker  On oral contraceptives   Currently on aspirin 325 mg   Will discuss BP parameters during rounds       RoPE score: 9 points       Antithrombotics for secondary stroke prevention: Antiplatelets: Aspirin: 325 mg daily    Statins for secondary stroke prevention and hyperlipidemia, if present:   Statins: Atorvastatin- 40 mg daily    Aggressive risk factor modification: None     Rehab efforts: The patient has been evaluated by a stroke team provider and the therapy needs have been fully considered based off the presenting complaints and exam findings. The following therapy evaluations are needed: PT evaluate and treat, OT evaluate and treat, SLP evaluate and treat    Diagnostics ordered/pending: YA to assess cardiac function/status     Stroke workup  -MRI Brain: acute left frontal infarct as well as an old left cerebellar infarct   -CT head: negative  -CUS: negative   -A1c: 5.2  -LDL: 84  -ECHO limited:    Limited to repeat bubble study. 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    -ECHO:   Study Details A complete echo was performed using complete 2D. There was agitated saline (bubble) used. Study is negative for shunt.  Overall the study quality was adequate.     Echocardiography Findings    Left Ventricle The left ventricle is normal in size. Mildly increased wall thickness. There is normal systolic function with a visually estimated  ejection fraction of 55 - 60%. There is normal diastolic function.   Right Ventricle Normal right ventricular cavity size.   Left Atrium Normal left atrial size.   Right Atrium Normal right atrial size.   Aortic Valve The aortic valve is structurally normal. There is normal leaflet mobility. Aortic valve peak velocity is 1.05 m/s. Mean gradient is 2 mmHg.   Mitral Valve The mitral valve is structurally normal. There is normal leaflet mobility. The mean pressure gradient across the mitral valve is 1 mmHg at a heart rate of  bpm. There is trace regurgitation.   Tricuspid Valve The tricuspid valve is structurally normal. There is normal leaflet mobility. There is trace regurgitation.   Pulmonic Valve The pulmonic valve is structurally normal. There is no significant regurgitation.   IVC/SVC Normal venous pressure at 3 mmHg.   Pericardium and Other Findings There is no pericardial effusion.   Pulmonary Artery       -CTA head and neck:   No evidence large vessel occlusion seen  Multiple thyroid nodules seen with 1 hypervascular nodule seen in the left thyroid.    -TSH: 2.544      VTE prophylaxis:   Mechanical prophylaxis: Place SCDs    BP parameters: Infarct: No intervention, SBP <220        Further recommendations to follow from MD    VTE Risk Mitigation (From admission, onward)           Ordered     IP VTE HIGH RISK PATIENT  Once         09/18/23 1528     Place sequential compression device  Until discontinued         09/18/23 1528                    Lisa Springer NP  Neurology  [facility] - 9th Floor Med Surg

## 2023-09-19 NOTE — PLAN OF CARE
Problem: Adult Inpatient Plan of Care  Goal: Plan of Care Review  Outcome: Ongoing, Progressing  Goal: Patient-Specific Goal (Individualized)  Outcome: Ongoing, Progressing  Goal: Absence of Hospital-Acquired Illness or Injury  Outcome: Ongoing, Progressing  Goal: Optimal Comfort and Wellbeing  Outcome: Ongoing, Progressing  Goal: Readiness for Transition of Care  Outcome: Ongoing, Progressing     Problem: Adjustment to Illness (Stroke, Ischemic/Transient Ischemic Attack)  Goal: Optimal Coping  Outcome: Ongoing, Progressing     Problem: Cerebral Tissue Perfusion (Stroke, Ischemic/Transient Ischemic Attack)  Goal: Optimal Cerebral Tissue Perfusion  Outcome: Ongoing, Progressing     Problem: Functional Ability Impaired (Stroke, Ischemic/Transient Ischemic Attack)  Goal: Optimal Functional Ability  Outcome: Ongoing, Progressing     Problem: Sensorimotor Impairment (Stroke, Ischemic/Transient Ischemic Attack)  Goal: Improved Sensorimotor Function  Outcome: Ongoing, Progressing

## 2023-09-19 NOTE — PT/OT/SLP EVAL
Occupational Therapy   Evaluation and Discharge Note    Name: Sharon Shaver  MRN: [id number]  Admitting Diagnosis: <principal problem not specified>  Recent Surgery: * No surgery found *      Recommendations:     Discharge Recommendations: home  Discharge Equipment Recommendations: none  Barriers to discharge:       Assessment:     Sharon Shaver is a 37 y.o. female with a medical diagnosis of acute L frontal lobe non hem CVA, L cerebellar old lacunar infarct and hx of L cerebellar old lacunar infarct and menstral cycle related migraines, OCP use.  At this time, patient is functioning at their prior level of function and does not require further acute OT services.   Pts main complaint is RUE tingling in digit tips. No weakness/coordination deficits. Pt indep in ADLs and functional mobility at this time. OT signing off.     Plan:     During this hospitalization, patient does not require further acute OT services.  Please re-consult if situation changes.    Plan of Care Reviewed with: patient    Subjective     Chief Complaint: tingling in R distal digits   Patient/Family Comments/goals: go home     Occupational Profile:  Living Environment: lives with  and kids, tub shower   Previous level of function: indep   Roles and Routines: parent, wife, works out, employed as    Equipment Used at Home: none  Assistance upon Discharge:      Pain/Comfort:  Pain Rating 1: 0/10    Patients cultural, spiritual, [employer] conflicts given the current situation:      Objective:     OT communicated with nrsg prior to session.      Patient was found ambulatory in room/andujar with   upon OT entry to room.    General Precautions: Standard,    Orthopedic Precautions:    Braces:      Vital Signs: Blood Pressure: 121/82  HR: 77        Functional Mobility/Transfers:  Patient completed Sit <> Stand Transfer with independence  with  no assistive device   Functional Mobility: no deficits in ADLs     Activities of Daily  Living:  Grooming: independence    Lower Body Dressing: independence    Toileting: independence      Functional Cognition:  Intact    Visual Perceptual Skills:  Intact    Upper Extremity Function:  Right Upper Extremity:   WFL  Sensation: tingling in digits distally     Left Upper Extremity:  WFL    Balance:   Intact    Therapeutic Positioning  Risk for acquired pressure injuries is decreased due to ability to mobilize independently .        Patient Education:  Patient provided with verbal education education regarding OT role/goals/POC.  Understanding was verbalized.     Patient left HOB elevated with all lines intact and call button in reach    GOALS:   Multidisciplinary Problems       Occupational Therapy Goals          Problem: Occupational Therapy    Goal Priority Disciplines Outcome Interventions   Occupational Therapy Goal     OT, PT/OT                         History:     No past medical history on file.    No past surgical history on file.    Time Tracking:     OT Date of Treatment:    OT Start Time: 0925  OT Stop Time: 0938  OT Total Time (min): 13 min    Billable Minutes:Evaluation Low Complexity     9/19/2023

## 2023-09-19 NOTE — NURSING
Nurses Note -- 4 Eyes      9/19/2023   2:10 AM      Skin assessed during: Admit      [x] No Altered Skin Integrity Present    []Prevention Measures Documented      [] Yes- Altered Skin Integrity Present or Discovered   [] LDA Added if Not in Epic (Describe Wound)   [] New Altered Skin Integrity was Present on Admit and Documented in LDA   [] Wound Image Taken    Wound Care Consulted? No    Attending Nurse:  Delvis Jean Baptiste RN/Staff Member:   Ilene Mills

## 2023-09-19 NOTE — SUBJECTIVE & OBJECTIVE
Subjective:     Interval History:   No new issues, remains with mild right sided numbness,  at bedside, echo tech back at bedside to repeat BS, reported positive BS.     Current Neurological Medications:     Current Facility-Administered Medications   Medication Dose Route Frequency Provider Last Rate Last Admin    labetaloL injection 10 mg  10 mg Intravenous Q6H PRN Monique Tamez, AGACNP-BC           Review of Systems   Neurological:  Positive for numbness. Negative for dizziness, tremors, seizures, syncope, facial asymmetry, speech difficulty, weakness, light-headedness and headaches.   All other systems reviewed and are negative.    Objective:     Vital Signs (Most Recent):  Temp: 98.7 °F (37.1 °C) (09/19/23 0727)  Pulse: 75 (09/19/23 0727)  Resp: 19 (09/19/23 0727)  BP: 111/74 (09/19/23 0727)  SpO2: 100 % (09/19/23 0727) Vital Signs (24h Range):  Temp:  [98.6 °F (37 °C)-98.7 °F (37.1 °C)] 98.7 °F (37.1 °C)  Pulse:  [62-79] 75  Resp:  [16-21] 19  SpO2:  [96 %-100 %] 100 %  BP: (111-128)/(58-88) 111/74     Weight: 75.8 kg (167 lb)  There is no height or weight on file to calculate BMI.     Physical Exam  Vitals and nursing note reviewed.   Constitutional:       General: She is not in acute distress.     Appearance: Normal appearance. She is not toxic-appearing.   HENT:      Head: Normocephalic.   Eyes:      General: No visual field deficit.     Extraocular Movements:      Right eye: Normal extraocular motion and no nystagmus.      Left eye: Normal extraocular motion and no nystagmus.      Pupils: Pupils are equal, round, and reactive to light.   Cardiovascular:      Rate and Rhythm: Normal rate.   Pulmonary:      Effort: Pulmonary effort is normal.      Breath sounds: Normal breath sounds.   Musculoskeletal:         General: No swelling. Normal range of motion.      Cervical back: Normal range of motion.      Right lower leg: No edema.      Left lower leg: No edema.   Skin:     General: Skin is warm  and dry.   Neurological:      General: No focal deficit present.      Mental Status: She is alert and oriented to person, place, and time.      Cranial Nerves: No dysarthria or facial asymmetry.      Sensory: Sensory deficit (RUE numbness) present.      Motor: Motor function is intact. No weakness, tremor, atrophy, abnormal muscle tone, seizure activity or pronator drift.      Coordination: Coordination normal. Finger-Nose-Finger Test normal.   Psychiatric:         Attention and Perception: Attention normal.         Mood and Affect: Affect normal.         Speech: Speech normal.         Behavior: Behavior normal.          NEUROLOGICAL EXAMINATION:     MENTAL STATUS   Oriented to person, place, and time.   Speech: speech is normal     CRANIAL NERVES     CN III, IV, VI   Pupils are equal, round, and reactive to light.    GAIT AND COORDINATION      Coordination   Finger to nose coordination: normal      Significant Labs:   Recent Lab Results  (Last 5 results in the past 24 hours)        09/19/23  0345   09/18/23 2017 09/18/23  1705   09/18/23  1505   09/18/23  1113        Albumin/Globulin Ratio 1.2               Albumin 3.7               Alkaline Phosphatase 45               ALT 21               Ao root annulus   3.40             Ao peak flako   1.05             Ao VTI   21.50             aPTT 24.9  Comment: For Minimal Heparin Infusion, the goal aPTT 64-85 seconds corresponds to an anti-Xa of 0.3-0.5.    For Low Intensity and High Intensity Heparin, the goal aPTT  seconds corresponds to an anti-Xa of 0.3-0.7       22.6  Comment: For Minimal Heparin Infusion, the goal aPTT 64-85 seconds corresponds to an anti-Xa of 0.3-0.5.    For Low Intensity and High Intensity Heparin, the goal aPTT  seconds corresponds to an anti-Xa of 0.3-0.7         AST 21               AV valve area   1.90             MEL by Velocity Ratio   2.09             AORTIC VALVE CUSP SEPERATION   1.90             AV mean gradient   2              AV index (prosthetic)   0.60             AV peak gradient   4             AV Velocity Ratio   0.67             Baso # 0.05               Basophil % 0.8               BILIRUBIN TOTAL 0.4               BUN 13.5               Calcium 9.3               Chloride 108               CO2 25               CPK MB <1.0               Creatinine 0.88               Left Ventricle Relative Wall Thickness   0.45             D-Dimer       0.53  Comment: D-dimer values of less than 0.5ug/mL FEU in adult patients with a clinically low pre-test probability of developing a DVT yield  a 99% negative predictive value.  D-dimer increases naturally with age, therefore the negative predictive value in patients older than 80 is 21 - 31%.    D-Dimer testing at [facility] is used as an aid in the diagnosis of VTE/PE. The D-Dimer test must be used with one or more additional tests such as imaging studies to evaluate VTE/PE         E/A ratio   0.88             E/E' ratio   5.11             eGFR >60               Eos # 0.38               Eosinophil % 6.5               Estimated Avg Glucose 102.5               E wave deceleration time   235.00             Factor VIII     212           Fibrinogen       300.0         FS   28             Globulin, Total 3.1               Glucose 100               Hematocrit 39.9               Hemoglobin 13.4               Hemoglobin A1C External 5.2               Homocysteine     6.2           Immature Grans (Abs) 0.02               Immature Granulocytes 0.3               INR 1.0       0.9         IVSd   0.81             LVOT area   3.1             LV LATERAL E/E' RATIO   4.60             LV SEPTAL E/E' RATIO   5.75             LV EDV BP   112.00             LVIDd   4.89             LVIDs   3.50             LV mass   166.08             Left Ventricular Outflow Tract Mean Gradient   1.00             Left Ventricular Outflow Tract Mean Velocity   0.44              LVOT diameter   2.00             LVOT peak ernesto   0.70             LVOT stroke volume   40.82             LVOT peak VTI   13.00             LV ESV BP   50.90             Lymph # 2.80               LYMPH % 47.5               Magnesium  2.10               MCH 29.5               MCHC 33.6               MCV 87.9               Mean e'   0.09             Mono # 0.66               Mono % 11.2               MPV 10.8               Mr max ernesto   2.94             MV valve area p 1/2 method   3.79             MV valve area by continuity eq   1.84             MV mean gradient   1             MV peak gradient   2             MV Peak A Ernesto   0.52             MV Peak E Ernesto   0.46             MV stenosis pressure 1/2 time   58.00             MV VTI   22.2             Neut # 1.98               Neut % 33.7               nRBC 0.0               Ron's Biplane MOD Ejection Fraction   57             Phosphorus 4.7               Platelets 251               Potassium 4.6               Preg Test, Ur         Negative       PROTEIN TOTAL 6.8               Protime 12.7       12.4         PV peak gradient   4             PV PEAK VELOCITY   1.06             Posterior Wall   1.11             RA Major Axis   4.02             Est. RA pres   3             RA Width   3.49             RBC 4.54               RDW 13.1               RV mid diameter   2.59             RV TB RVSP   5             Sodium 141               TAPSE   1.69             TDI SEPTAL   0.08             TDI LATERAL   0.10             Triscuspid Valve Regurgitation Peak Gradient   16             TR Max Ernesto   1.99             Troponin I <0.010               TV resting pulmonary artery pressure   19             WBC 5.89                                      Significant Imaging: I have reviewed and interpreted all pertinent imaging results/findings within the past 24 hours.

## 2023-09-19 NOTE — PT/OT/SLP EVAL
Physical Therapy Evaluation and Discharge Note    Patient Name:  Sharon Shaver   MRN:  [id number]    Recommendations:     Discharge Recommendations: home  Discharge Equipment Recommendations: none   Barriers to discharge: Ongoing medical needs    Assessment:     Sharon Shaver is a 37 y.o. female admitted d/t R arm numbness. Pt's medical diagnosis includes acute left frontal infarct and an old left cerebellar infarct.  At this time, patient is functioning at their prior level of function and does not require further acute PT services. Pt demo no safety or mobility concerns during evaluation. Pt is safe to return home. Pt performed bed mobility, sit to stand transfers, and amb about 180ft independently.     Recent Surgery: * No surgery found *      Plan:     During this hospitalization, patient does not require further acute PT services.  Please re-consult if situation changes.      Subjective     Chief Complaint: numbness and tingling in finger tips (R hand)   Patient/Family Comments/goals: none stated  Pain/Comfort: no px reported      Patients cultural, spiritual, Rastafarian conflicts given the current situation:  No     Living Environment:  Pt lives in a single level home with no steps upon entry. Pt currently works as .   Prior to admission, patients level of function was Independent.  Equipment used at home: none.  DME owned (not currently used): none.      Objective:     Communicated with NSG prior to session.  Patient found HOB elevated upon PT entry to room.    General Precautions: Standard,      Respiratory Status: Room air      Exams:  Cognitive Exam:  Patient is oriented to Person, Place, and Time  BLE ROM: WNL  BLE Strength: WNL    Functional Mobility:  Bed Mobility:     Supine to Sit: independence  Transfers:     Sit <> Stand:  independence  Gait: Pt amb about 180ft Independently with no AD demo steady step through gait pattern, upright posture, and no LOB noted.     Treatment and Education:      Patient  provided with verbal education education regarding mobility and POC.  Understanding was verbalized.     Patient left sitting edge of bed with call button in reach.    GOALS:   Multidisciplinary Problems       Physical Therapy Goals       Not on file                    History:     No past medical history on file.    No past surgical history on file.    Time Tracking:     PT Received On: 09/19/23  PT Start Time: 1042     PT Stop Time: 1047  PT Total Time (min): 5 min     Billable Minutes: Evaluation low      09/19/2023

## 2023-09-19 NOTE — PT/OT/SLP EVAL
[facility]  Speech Language Pathology Department  Cognitive-Communication Evaluation    Patient Name:  Sharon Shaver   MRN:  [id number]    Recommendations:     General recommendations:  SLP intervention not indicated  Communication strategies:  none  Barriers to safe discharge: none    History:     Sharon Shaver is a/n 37 y.o. female admitted with right arm numbness. MRI significant for acute left frontal infarct and old left cerebellar infarct.     Previous level of Function  Education:college  Occupation: full time job  Lives: with spouse  Handed: Right  Glasses: no  Hearing Aids: no  Home Responsibilities: drives, financial management, medication/health management, and meal preparation    Imaging   Results for orders placed during the hospital encounter of 09/18/23    MRI Brain Without Contrast    Narrative  EXAMINATION:  MRI BRAIN WITHOUT CONTRAST    CLINICAL HISTORY:  Transient ischemic attack (TIA);    TECHNIQUE:  Multiplanar MRI sequences were performed of the brain without contrast.    COMPARISON:  CT of the brain September 18, 2023    FINDINGS:  There are acute nonhemorrhagic lacunar infarcts involving the left frontal lobe with diffusion weighted restrictions and ADC map dark signal on images 10 and 8 series 4.  There are also associated T2 FLAIR hyperintensities.  There is left cerebellar old lacunar infarct.  Right periventricular punctate T2 FLAIR hyperintensity is consistent with very minimal chronic ischemia. Gradient echo sequences demonstrate no evidence of de phasing artifact to suggest hemorrhagic byproducts. No evidence of diffusion restriction or ADC map signal drop out to suggest acute infarct. The sella and suprasellar areas are unremarkable.    The cerebellar tonsils are normally positioned. There is no acute intracranial hemorrhage, hydrocephalus, midline shift or mass effect. No acute extra axial fluid collections identified. The mastoid air cells are  "clear.    Impression  1.  Left frontal lobe acute nonhemorrhagic lacunar infarcts.    2.  Left cerebellar old lacunar infarct and very minimal chronic microvascular ischemia.    Electronically signed by: Zaheer Amezquita  Date:    09/18/2023  Time:    14:36    Subjective     Patient awake, alert, and oriented x4 .  Patient goals: "to get better"   Spiritual/Cultural/Jew Beliefs/Practices that affect care: no  Pain/Comfort:  0/10    Objective:     ORAL MUSCULATURE  Dentition: own teeth  Facial Movement: WFL  Buccal Strength & Mobility: WFL  Mandibular Strength & Mobility: WFL  Oral Labial Strength & Mobility: WFL    SPEECH PRODUCTION  Phoneme Production: adequate  Voice Quality: adequate  Voice Production: adequate  Speech Rate: appropriate  Loudness: acceptable  Respiration: WFL for speech  Speech Intelligibility  Known Context: Greater that 90%  Unknown Context: Greater that 90%    AUDITORY COMPREHENSION  Identification:  Body parts: 100%  Objects: 100%  Following Directions:  1-Step: 100%  2-Step: 100%  Yes/No Questions:  Biographical: 100%  Environmental: 100%  Simple: 100%  Complex: 100%    VERBAL EXPRESSION  Automatic Speech:  Days of the week: 100%  Months of the year: 100%  Repetition:  Phonemes: 100%  Single syllable words: 100%  Bi-syllabic words: 100%  Phrase Completion: 100%  Confrontation Naming  Body Parts: 100%  Objects: 100%  Wh- Questions:  Object name: WNL  Object function: WNL    COGNITION  Orientation:  Person: yes  Place: yes  Time: yes  Situation: yes   Attention:  Focused: WNL  Sustained: WNL  Memory:  Immediate: WNL  Delayed: WNL  Short Term: WNL  Problem Solving  Functional simple: WNL  Functional complex: WNL  Organization:  Convergent thinking: WNL  Divergent thinking: WNL  Sequencing: WNL  Executive Function:  Attention to detail: WNL  Awareness: WNL  Planning: WNL    Assessment:     Pt presents with functional speech and language skills, cognitive linguistic skills note to be at " baseline. No skilled SLP intervention is warranted at this time.     Patient Education:     Patient provided with verbal education regarding POC.  Understanding was verbalized.     Time Tracking:     SLP Treatment Date:    9/19/23  Speech Start Time:   0830  Speech Stop Time:      0845  Speech Total Time (min):   15    Billable minutes:  Evaluation of Speech Sound Production with Comprehension and Expression, 15 minutes     09/19/2023

## 2023-09-19 NOTE — ASSESSMENT & PLAN NOTE
Stroke      Will consult cardiology about positive bubble study   Nonsmoker  On oral contraceptives   Currently on aspirin 325 mg   Will discuss BP parameters during rounds       RoPE score: 9 points       Antithrombotics for secondary stroke prevention: Antiplatelets: Aspirin: 325 mg daily    Statins for secondary stroke prevention and hyperlipidemia, if present:   Statins: Atorvastatin- 40 mg daily    Aggressive risk factor modification: None     Rehab efforts: The patient has been evaluated by a stroke team provider and the therapy needs have been fully considered based off the presenting complaints and exam findings. The following therapy evaluations are needed: PT evaluate and treat, OT evaluate and treat, SLP evaluate and treat    Diagnostics ordered/pending: YA to assess cardiac function/status     Stroke workup  -MRI Brain: acute left frontal infarct as well as an old left cerebellar infarct   -CT head: negative  -CUS: negative   -A1c: 5.2  -LDL: 84  -ECHO limited:    Limited to repeat bubble study. 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    -ECHO:   Study Details A complete echo was performed using complete 2D. There was agitated saline (bubble) used. Study is negative for shunt.  Overall the study quality was adequate.     Echocardiography Findings    Left Ventricle The left ventricle is normal in size. Mildly increased wall thickness. There is normal systolic function with a visually estimated ejection fraction of 55 - 60%. There is normal diastolic function.   Right Ventricle Normal right ventricular cavity size.   Left Atrium Normal left atrial size.   Right Atrium Normal right atrial size.   Aortic Valve The aortic valve is structurally normal. There is normal leaflet mobility. Aortic valve peak velocity is 1.05 m/s. Mean gradient is 2 mmHg.   Mitral Valve The mitral valve is structurally normal. There is normal leaflet mobility. The mean pressure gradient across the mitral valve is 1  mmHg at a heart rate of  bpm. There is trace regurgitation.   Tricuspid Valve The tricuspid valve is structurally normal. There is normal leaflet mobility. There is trace regurgitation.   Pulmonic Valve The pulmonic valve is structurally normal. There is no significant regurgitation.   IVC/SVC Normal venous pressure at 3 mmHg.   Pericardium and Other Findings There is no pericardial effusion.   Pulmonary Artery       -CTA head and neck:   No evidence large vessel occlusion seen  Multiple thyroid nodules seen with 1 hypervascular nodule seen in the left thyroid.    -TSH: 2.544      VTE prophylaxis: Enoxaparin 40 mg SQ every 24 hours  Mechanical prophylaxis: Place SCDs    BP parameters: Infarct: No intervention, SBP <220

## 2023-09-20 ENCOUNTER — ANESTHESIA (OUTPATIENT)
Dept: CARDIOLOGY | Facility: HOSPITAL | Age: 38
DRG: 065 | End: 2023-09-20
Payer: COMMERCIAL

## 2023-09-20 ENCOUNTER — ANESTHESIA EVENT (OUTPATIENT)
Dept: CARDIOLOGY | Facility: HOSPITAL | Age: 38
DRG: 065 | End: 2023-09-20
Payer: COMMERCIAL

## 2023-09-20 VITALS
TEMPERATURE: 97 F | SYSTOLIC BLOOD PRESSURE: 107 MMHG | DIASTOLIC BLOOD PRESSURE: 66 MMHG | OXYGEN SATURATION: 99 % | RESPIRATION RATE: 17 BRPM | HEART RATE: 77 BPM

## 2023-09-20 VITALS
WEIGHT: 167 LBS | HEART RATE: 68 BPM | DIASTOLIC BLOOD PRESSURE: 76 MMHG | TEMPERATURE: 98 F | SYSTOLIC BLOOD PRESSURE: 112 MMHG | RESPIRATION RATE: 18 BRPM | OXYGEN SATURATION: 95 %

## 2023-09-20 LAB
ALBUMIN SERPL-MCNC: 3.7 G/DL (ref 3.5–5)
ALBUMIN/GLOB SERPL: 1.2 RATIO (ref 1.1–2)
ALP SERPL-CCNC: 45 UNIT/L (ref 40–150)
ALT SERPL-CCNC: 21 UNIT/L (ref 0–55)
AST SERPL-CCNC: 18 UNIT/L (ref 5–34)
AT III ACT/NOR PPP CHRO: 101 % (ref 80–130)
BASOPHILS # BLD AUTO: 0.04 X10(3)/MCL
BASOPHILS NFR BLD AUTO: 0.6 %
BILIRUB SERPL-MCNC: 0.7 MG/DL
BUN SERPL-MCNC: 12.1 MG/DL (ref 7–18.7)
CALCIUM SERPL-MCNC: 9.5 MG/DL (ref 8.4–10.2)
CHLORIDE SERPL-SCNC: 109 MMOL/L (ref 98–107)
CO2 SERPL-SCNC: 23 MMOL/L (ref 22–29)
CREAT SERPL-MCNC: 0.86 MG/DL (ref 0.55–1.02)
EOSINOPHIL # BLD AUTO: 0.36 X10(3)/MCL (ref 0–0.9)
EOSINOPHIL NFR BLD AUTO: 5.7 %
ERYTHROCYTE [DISTWIDTH] IN BLOOD BY AUTOMATED COUNT: 13 % (ref 11.5–17)
GFR SERPLBLD CREATININE-BSD FMLA CKD-EPI: >60 MLS/MIN/1.73/M2
GLOBULIN SER-MCNC: 3 GM/DL (ref 2.4–3.5)
GLUCOSE SERPL-MCNC: 95 MG/DL (ref 74–100)
HCT VFR BLD AUTO: 40.6 % (ref 37–47)
HGB BLD-MCNC: 13.7 G/DL (ref 12–16)
IMM GRANULOCYTES # BLD AUTO: 0.02 X10(3)/MCL (ref 0–0.04)
IMM GRANULOCYTES NFR BLD AUTO: 0.3 %
LPA SERPL-SCNC: 13 NMOL/L
LYMPHOCYTES # BLD AUTO: 2.37 X10(3)/MCL (ref 0.6–4.6)
LYMPHOCYTES NFR BLD AUTO: 37.8 %
MCH RBC QN AUTO: 29.5 PG (ref 27–31)
MCHC RBC AUTO-ENTMCNC: 33.7 G/DL (ref 33–36)
MCV RBC AUTO: 87.3 FL (ref 80–94)
MIXING STUDIES PPP-IMP: NORMAL
MONOCYTES # BLD AUTO: 0.63 X10(3)/MCL (ref 0.1–1.3)
MONOCYTES NFR BLD AUTO: 10 %
NEUTROPHILS # BLD AUTO: 2.85 X10(3)/MCL (ref 2.1–9.2)
NEUTROPHILS NFR BLD AUTO: 45.6 %
NRBC BLD AUTO-RTO: 0 %
PLATELET # BLD AUTO: 215 X10(3)/MCL (ref 130–400)
PMV BLD AUTO: 10 FL (ref 7.4–10.4)
POTASSIUM SERPL-SCNC: 5 MMOL/L (ref 3.5–5.1)
PROT C ACT/NOR PPP CHRO: 143 % (ref 70–150)
PROT SERPL-MCNC: 6.7 GM/DL (ref 6.4–8.3)
RBC # BLD AUTO: 4.65 X10(6)/MCL (ref 4.2–5.4)
SCREEN DRVVT/NORMAL: 0.91 RATIO
SODIUM SERPL-SCNC: 139 MMOL/L (ref 136–145)
WBC # SPEC AUTO: 6.27 X10(3)/MCL (ref 4.5–11.5)

## 2023-09-20 PROCEDURE — 80053 COMPREHEN METABOLIC PANEL: CPT | Performed by: NURSE PRACTITIONER

## 2023-09-20 PROCEDURE — D9220A PRA ANESTHESIA: Mod: CRNA,,,

## 2023-09-20 PROCEDURE — 63600175 PHARM REV CODE 636 W HCPCS

## 2023-09-20 PROCEDURE — D9220A PRA ANESTHESIA: ICD-10-PCS | Mod: ANES,,, | Performed by: STUDENT IN AN ORGANIZED HEALTH CARE EDUCATION/TRAINING PROGRAM

## 2023-09-20 PROCEDURE — D9220A PRA ANESTHESIA: ICD-10-PCS | Mod: CRNA,,,

## 2023-09-20 PROCEDURE — 25000003 PHARM REV CODE 250

## 2023-09-20 PROCEDURE — D9220A PRA ANESTHESIA: Mod: ANES,,, | Performed by: STUDENT IN AN ORGANIZED HEALTH CARE EDUCATION/TRAINING PROGRAM

## 2023-09-20 PROCEDURE — 85025 COMPLETE CBC W/AUTO DIFF WBC: CPT | Performed by: NURSE PRACTITIONER

## 2023-09-20 PROCEDURE — 25000003 PHARM REV CODE 250: Performed by: STUDENT IN AN ORGANIZED HEALTH CARE EDUCATION/TRAINING PROGRAM

## 2023-09-20 RX ORDER — LIDOCAINE HYDROCHLORIDE 20 MG/ML
INJECTION, SOLUTION EPIDURAL; INFILTRATION; INTRACAUDAL; PERINEURAL
Status: DISCONTINUED | OUTPATIENT
Start: 2023-09-20 | End: 2023-09-20

## 2023-09-20 RX ORDER — PROPOFOL 10 MG/ML
VIAL (ML) INTRAVENOUS
Status: DISCONTINUED | OUTPATIENT
Start: 2023-09-20 | End: 2023-09-20

## 2023-09-20 RX ORDER — ASPIRIN 325 MG
325 TABLET, DELAYED RELEASE (ENTERIC COATED) ORAL DAILY
Qty: 30 TABLET | Refills: 3 | Status: SHIPPED | OUTPATIENT
Start: 2023-09-21 | End: 2024-01-11 | Stop reason: ALTCHOICE

## 2023-09-20 RX ORDER — ATORVASTATIN CALCIUM 40 MG/1
40 TABLET, FILM COATED ORAL DAILY
Qty: 90 TABLET | Refills: 3 | Status: SHIPPED | OUTPATIENT
Start: 2023-09-21 | End: 2023-10-31

## 2023-09-20 RX ORDER — GLYCOPYRROLATE 0.2 MG/ML
INJECTION INTRAMUSCULAR; INTRAVENOUS
Status: DISCONTINUED | OUTPATIENT
Start: 2023-09-20 | End: 2023-09-20

## 2023-09-20 RX ADMIN — SODIUM CHLORIDE, SODIUM GLUCONATE, SODIUM ACETATE, POTASSIUM CHLORIDE AND MAGNESIUM CHLORIDE: 526; 502; 368; 37; 30 INJECTION, SOLUTION INTRAVENOUS at 09:09

## 2023-09-20 RX ADMIN — ATORVASTATIN CALCIUM 40 MG: 40 TABLET, FILM COATED ORAL at 09:09

## 2023-09-20 RX ADMIN — ASPIRIN 325 MG: 325 TABLET, COATED ORAL at 09:09

## 2023-09-20 RX ADMIN — GLYCOPYRROLATE 0.2 MG: 0.2 INJECTION INTRAMUSCULAR; INTRAVENOUS at 09:09

## 2023-09-20 RX ADMIN — PROPOFOL 50 MG: 10 INJECTION, EMULSION INTRAVENOUS at 10:09

## 2023-09-20 RX ADMIN — PROPOFOL 100 MG: 10 INJECTION, EMULSION INTRAVENOUS at 09:09

## 2023-09-20 RX ADMIN — LIDOCAINE HYDROCHLORIDE 80 MG: 20 INJECTION, SOLUTION EPIDURAL; INFILTRATION; INTRACAUDAL; PERINEURAL at 09:09

## 2023-09-20 NOTE — ANESTHESIA POSTPROCEDURE EVALUATION
Anesthesia Post Evaluation    Patient: Sharon Legsumi    Procedure(s) Performed: * No procedures listed *    Final Anesthesia Type: general      Patient location during evaluation: PACU  Patient participation: Yes- Able to Participate  Level of consciousness: awake and alert  Post-procedure vital signs: reviewed and stable  Pain management: adequate  Airway patency: patent    PONV status at discharge: No PONV  Anesthetic complications: no      Respiratory status: unassisted  Hydration status: euvolemic  Follow-up not needed.          Vitals Value Taken Time   /76 09/20/23 1123   Temp 36.4 °C (97.5 °F) 09/20/23 1123   Pulse 68 09/20/23 1123   Resp 18 09/20/23 1123   SpO2 95 % 09/20/23 1123         No case tracking events are documented in the log.      Pain/Luann Score: No data recorded

## 2023-09-20 NOTE — PLAN OF CARE
Problem: Adult Inpatient Plan of Care  Goal: Plan of Care Review  Outcome: Ongoing, Progressing  Goal: Patient-Specific Goal (Individualized)  Outcome: Ongoing, Progressing  Goal: Absence of Hospital-Acquired Illness or Injury  Outcome: Ongoing, Progressing  Goal: Optimal Comfort and Wellbeing  Outcome: Ongoing, Progressing  Goal: Readiness for Transition of Care  Outcome: Ongoing, Progressing     Problem: Infection  Goal: Absence of Infection Signs and Symptoms  Outcome: Ongoing, Progressing     Problem: Adjustment to Illness (Stroke, Ischemic/Transient Ischemic Attack)  Goal: Optimal Coping  Outcome: Ongoing, Progressing     Problem: Bowel Elimination Impaired (Stroke, Ischemic/Transient Ischemic Attack)  Goal: Effective Bowel Elimination  Outcome: Ongoing, Progressing

## 2023-09-20 NOTE — TRANSFER OF CARE
Anesthesia Transfer of Care Note    Patient: Sharon Lege    Procedure(s) Performed: * No procedures listed *    Patient location: Other: CVSS    Anesthesia Type: general    Transport from OR: Transported from OR on room air with adequate spontaneous ventilation    Post pain: adequate analgesia    Post assessment: no apparent anesthetic complications    Post vital signs: stable    Level of consciousness: awake and responds to stimulation    Nausea/Vomiting: no nausea/vomiting    Complications: none    Transfer of care protocol was followed      Last vitals:   Visit Vitals  /88   Pulse 66   Temp 36.6 °C (97.8 °F) (Oral)   Resp 20   Wt 75.8 kg (167 lb)   SpO2 100%   Breastfeeding No

## 2023-09-20 NOTE — NURSING
Stroke Coordinator Phillip.    Discussed the below with patient and  who was at bedside.     The patient is very pleasant and stated that her only symptoms as of this moment are numbness and tingling in her right hand. She expressed frustration that she has done a lot in her life to keep herself healthy and unfortunately this still happened to her. Empathy offered.    Patient/Family Stroke Education:    I have discussed the patient's stroke risk factors and the importance to modify them in order to reduce the risk of future events.  Also, the importance of a healthy diet and daily exercise in order to reduce the risk of future strokes.  I went over the usual stroke warning signs and symptoms, and the need to activate EMS (call 911) as soon as the symptoms present.  -Sudden onset numbness or weakness of the face, arm, or leg; especially on one side of the body  -Sudden confusion, trouble speaking, or understanding  -Sudden trouble seeing in one or both eyes  -Sudden trouble walking, dizziness, loss of coordination  -Sudden severe headache with no known cause  -Patient will have a follow up appointment in the stroke clinic.   -Written education given on the above.      Additional Handouts given:     N/A

## 2023-09-20 NOTE — NURSING
Nurses Note -- 4 Eyes      9/20/2023   8:21 AM      Skin assessed during: Transfer      [x] No Altered Skin Integrity Present    []Prevention Measures Documented      [] Yes- Altered Skin Integrity Present or Discovered   [] LDA Added if Not in Epic (Describe Wound)   [] New Altered Skin Integrity was Present on Admit and Documented in LDA   [] Wound Image Taken    Wound Care Consulted? No    Attending Nurse:  arthur Fernando LPN    Second RN/Staff Member:   Denise dias RN

## 2023-09-20 NOTE — PROGRESS NOTES
[facility] - Neurology    Cardiology  Progress Note    Patient Name: Sharon Shaver  MRN: [id number]  Admission Date: 9/18/2023  Hospital Length of Stay: 2 days  Code Status: Full Code   Attending Physician: Tj Young MD   Primary Care Physician: Jennifer, Primary Doctor  Expected Discharge Date:   Principal Problem:<principal problem not specified>    Subjective:   Reason for Consult: YA      HPI:   Ms. Shaver is a 37 year old female who is unknown to [facility]. She presents to the ER on 9.18.23 with complaints of right arm numbness that began earlier that morning. She denies CP, SOB, palpitations, nausea, vomiting, HA, slurred speech, or vision changes. She reports improvement in the numbness. 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 She has since resumed taking them since 2016. Her lab work is significant for d-dimer 0.53 and triglycerides. An MRI of the brain was obtained and demonstrated left frontal acute nonhemorrhagic lacunar infarcts and left cerebellar old lacunar infarct. A CTA head and neck was obtained and negative for large vessel occlusion and multiple thyroid nodules seen with 1 hypervascular nodule in the left thyroid. An echo was obtained and demonstrated a positive bubble study. Neuro is on board with recommendations for YA to further evaluate positive bubble study, which is the reason [facility] has been consulted.     Hospital Course:   9.20.23: NAD Noted. YA Today. Tolerated procedure well. Found to have large PFO.      PMH: Migraines  PSH: Denies  Family History: Mother - CVA; Father - PPM   Social History: Denies Alcohol, Tobacco, or Illicit drug use.      Previous Cardiac Diagnostics:   YA (9.20.23):  Large PFO    Limited TTE (9.19.23):  Limited to repeat bubble study. 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     TTE (9.19.23):  Left Ventricle: The left ventricle is normal in size. Mildly increased  wall thickness. There is normal systolic function with a visually estimated ejection fraction of 55 - 60%. There is normal diastolic function.  Right Ventricle: Normal right ventricular cavity size.  IVC/SVC: Normal venous pressure at 3 mmHg.    Carotid US (9.18.23):  Bilateral Internal Carotid arteries show no significant stenosis.  Bilateral Vertebral arteries show antegrade flow.    Review of Systems   Cardiovascular:  Negative for chest pain.   Respiratory:  Negative for shortness of breath.    All other systems reviewed and are negative.    Objective:     Vital Signs (Most Recent):  Temp: 97.9 °F (36.6 °C) (09/20/23 1037)  Pulse: 62 (09/20/23 1037)  Resp: 18 (09/20/23 1037)  BP: 119/81 (09/20/23 1037)  SpO2: 97 % (09/20/23 1037) Vital Signs (24h Range):  Temp:  [96.8 °F (36 °C)-98.8 °F (37.1 °C)] 97.9 °F (36.6 °C)  Pulse:  [62-86] 62  Resp:  [16-20] 18  SpO2:  [96 %-100 %] 97 %  BP: (102-119)/(66-88) 119/81     Weight: 75.8 kg (167 lb)  There is no height or weight on file to calculate BMI.    SpO2: 97 %         Intake/Output Summary (Last 24 hours) at 9/20/2023 1107  Last data filed at 9/20/2023 1005  Gross per 24 hour   Intake 270 ml   Output --   Net 270 ml       Lines/Drains/Airways       Peripheral Intravenous Line  Duration                  Peripheral IV - Single Lumen 09/18/23 1456 20 G Right Antecubital 1 day                  Significant Labs:   Recent Results (from the past 72 hour(s))   POCT glucose    Collection Time: 09/18/23  9:03 AM   Result Value Ref Range    POCT Glucose 80 70 - 110 mg/dL   CBC with Differential    Collection Time: 09/18/23  9:58 AM   Result Value Ref Range    WBC 6.06 4.50 - 11.50 x10(3)/mcL    RBC 4.81 4.20 - 5.40 x10(6)/mcL    Hgb 14.2 12.0 - 16.0 g/dL    Hct 42.1 37.0 - 47.0 %    MCV 87.5 80.0 - 94.0 fL    MCH 29.5 27.0 - 31.0 pg    MCHC 33.7 33.0 - 36.0 g/dL    RDW 13.0 11.5 - 17.0 %    Platelet 233 130 - 400 x10(3)/mcL    MPV 10.1 7.4 - 10.4 fL    Neut % 58.6 %    Lymph %  30.9 %    Mono % 6.9 %    Eos % 2.6 %    Basophil % 0.7 %    Lymph # 1.87 0.6 - 4.6 x10(3)/mcL    Neut # 3.55 2.1 - 9.2 x10(3)/mcL    Mono # 0.42 0.1 - 1.3 x10(3)/mcL    Eos # 0.16 0 - 0.9 x10(3)/mcL    Baso # 0.04 <=0.2 x10(3)/mcL    IG# 0.02 0 - 0.04 x10(3)/mcL    IG% 0.3 %    NRBC% 0.0 %   Comprehensive Metabolic Panel    Collection Time: 09/18/23  9:58 AM   Result Value Ref Range    Sodium Level 135 (L) 136 - 145 mmol/L    Potassium Level 4.2 3.5 - 5.1 mmol/L    Chloride 105 98 - 107 mmol/L    Carbon Dioxide 23 22 - 29 mmol/L    Glucose Level 97 74 - 100 mg/dL    Blood Urea Nitrogen 8.5 7.0 - 18.7 mg/dL    Creatinine 0.81 0.55 - 1.02 mg/dL    Calcium Level Total 9.7 8.4 - 10.2 mg/dL    Protein Total 7.4 6.4 - 8.3 gm/dL    Albumin Level 4.0 3.5 - 5.0 g/dL    Globulin 3.4 2.4 - 3.5 gm/dL    Albumin/Globulin Ratio 1.2 1.1 - 2.0 ratio    Bilirubin Total 0.6 <=1.5 mg/dL    Alkaline Phosphatase 48 40 - 150 unit/L    Alanine Aminotransferase 19 0 - 55 unit/L    Aspartate Aminotransferase 19 5 - 34 unit/L    eGFR >60 mls/min/1.73/m2   Magnesium    Collection Time: 09/18/23  9:58 AM   Result Value Ref Range    Magnesium Level 2.10 1.60 - 2.60 mg/dL   Lipid Panel    Collection Time: 09/18/23  9:58 AM   Result Value Ref Range    Cholesterol Total 160 <=200 mg/dL    HDL Cholesterol 46 35 - 60 mg/dL    Triglyceride 148 (H) 37 - 140 mg/dL    Cholesterol/HDL Ratio 3 0 - 5    Very Low Density Lipoprotein 30     LDL Cholesterol 84.00 50.00 - 140.00 mg/dL   TSH    Collection Time: 09/18/23  9:58 AM   Result Value Ref Range    Thyroid Stimulating Hormone 2.544 0.350 - 4.940 uIU/mL   C-reactive protein    Collection Time: 09/18/23  9:58 AM   Result Value Ref Range    C-Reactive Protein <1.00 <5.00 mg/L   Pregnancy, urine rapid    Collection Time: 09/18/23 11:13 AM   Result Value Ref Range    Beta hCG Qualitative, Urine Negative Negative   Protime-INR    Collection Time: 09/18/23  3:05 PM   Result Value Ref Range    PT 12.4 (L) 12.5  - 14.5 seconds    INR 0.9 <=1.3   APTT    Collection Time: 09/18/23  3:05 PM   Result Value Ref Range    PTT 22.6 (L) 23.2 - 33.7 seconds   Fibrinogen    Collection Time: 09/18/23  3:05 PM   Result Value Ref Range    Fibrinogen 300.0 210.0 - 463.0 mg/dL   D-dimer, quantitative    Collection Time: 09/18/23  3:05 PM   Result Value Ref Range    D-Dimer 0.53 (H) 0.00 - 0.50 ug/mL FEU   Homocysteine, serum    Collection Time: 09/18/23  5:05 PM   Result Value Ref Range    Homocysteine Total 6.2 5.1 - 15.4 umol/L   Factor 8 assay    Collection Time: 09/18/23  5:05 PM   Result Value Ref Range    Factor VIII 212 (H) 59 - 191 %   CV Ultrasound Bilateral Doppler Carotid    Collection Time: 09/18/23  8:13 PM   Result Value Ref Range    Right CCA prox sys 75 cm/s    Right CCA prox mccall 10 cm/s    Right CCA dist sys 98 cm/s    Right CCA dist mccall 18 cm/s    Right ICA prox sys 69 cm/s    Right ICA prox mccall 16 cm/s    Right ICA mid sys 93 cm/s    Right ICA mid mccall 31 cm/s    Right ICA dist sys 69 cm/s    Right ICA dist mccall 18 cm/s    Right ECA sys 67 cm/s    Right ECA mccall 3 cm/s    Right vertebral sys 60 cm/s    Right vertebral mccall 17 cm/s    Right ICA/CCA ratio 0.95     Right Highest ICA 93.00     Right Highest EDV 31.00     Right Highest CCA 98     Left CCA prox sys 101 cm/s    Left CCA prox mccall 20 cm/s    Left CCA dist sys 97 cm/s    Left CCA dist mccall 16 cm/s    Left ICA prox sys 92 cm/s    Left ICA prox mccall 24 cm/s    Left ICA mid sys 84 cm/s    Left ICA mid mccall 33 cm/s    Left ICA dist sys 104 cm/s    Left ICA dist mccall 50 cm/s    Left ECA sys 81 cm/s    Left ECA mccall 15 cm/s    Left vertebral sys 61 cm/s    Left vertebral mccall 15 cm/s    Left ICA/CCA ratio 1.07     Left Highest .00     LT Highest EDV 50.00     Left Highest     Echo    Collection Time: 09/18/23  8:17 PM   Result Value Ref Range    Ron's Biplane MOD Ejection Fraction 57 %    LVOT stroke volume 40.82 cm3    LVIDd 4.89 3.5 - 6.0 cm     LV Systolic Volume 50.90 mL    LVIDs 3.50 2.1 - 4.0 cm    LV Diastolic Volume 112.00 mL    IVS 0.81 0.6 - 1.1 cm    LVOT diameter 2.00 cm    LVOT area 3.1 cm2    FS 28 28 - 44 %    Left Ventricle Relative Wall Thickness 0.45 cm    Posterior Wall 1.11 (A) 0.6 - 1.1 cm    LV mass 166.08 g    MV Peak E Ernesto 0.46 m/s    TDI LATERAL 0.10 m/s    TDI SEPTAL 0.08 m/s    E/E' ratio 5.11 m/s    MV Peak A Ernesto 0.52 m/s    TR Max Ernesto 1.99 m/s    E/A ratio 0.88     E wave deceleration time 235.00 msec    LV SEPTAL E/E' RATIO 5.75 m/s    LV LATERAL E/E' RATIO 4.60 m/s    LVOT peak ernesto 0.70 m/s    Left Ventricular Outflow Tract Mean Velocity 0.44 cm/s    Left Ventricular Outflow Tract Mean Gradient 1.00 mmHg    RV mid diameter 2.59 cm    TAPSE 1.69 cm    RA Major Axis 4.02 cm    RA Width 3.49 cm    AV mean gradient 2 mmHg    AV peak gradient 4 mmHg    Ao peak ernesto 1.05 m/s    Ao VTI 21.50 cm    LVOT peak VTI 13.00 cm    AV valve area 1.90 cm²    AV Velocity Ratio 0.67     AV index (prosthetic) 0.60     MEL by Velocity Ratio 2.09 cm²    Mr max ernesto 2.94 m/s    MV mean gradient 1 mmHg    MV peak gradient 2 mmHg    MV stenosis pressure 1/2 time 58.00 ms    MV valve area p 1/2 method 3.79 cm2    MV valve area by continuity eq 1.84 cm2    MV VTI 22.2 cm    Triscuspid Valve Regurgitation Peak Gradient 16 mmHg    PV PEAK VELOCITY 1.06 m/s    PV peak gradient 4 mmHg    Ao root annulus 3.40 cm    Mean e' 0.09 m/s    AORTIC VALVE CUSP SEPERATION 1.90 cm    TV resting pulmonary artery pressure 19 mmHg    RV TB RVSP 5 mmHg    Est. RA pres 3 mmHg   Comprehensive metabolic panel    Collection Time: 09/19/23  3:45 AM   Result Value Ref Range    Sodium Level 141 136 - 145 mmol/L    Potassium Level 4.6 3.5 - 5.1 mmol/L    Chloride 108 (H) 98 - 107 mmol/L    Carbon Dioxide 25 22 - 29 mmol/L    Glucose Level 100 74 - 100 mg/dL    Blood Urea Nitrogen 13.5 7.0 - 18.7 mg/dL    Creatinine 0.88 0.55 - 1.02 mg/dL    Calcium Level Total 9.3 8.4 - 10.2 mg/dL     Protein Total 6.8 6.4 - 8.3 gm/dL    Albumin Level 3.7 3.5 - 5.0 g/dL    Globulin 3.1 2.4 - 3.5 gm/dL    Albumin/Globulin Ratio 1.2 1.1 - 2.0 ratio    Bilirubin Total 0.4 <=1.5 mg/dL    Alkaline Phosphatase 45 40 - 150 unit/L    Alanine Aminotransferase 21 0 - 55 unit/L    Aspartate Aminotransferase 21 5 - 34 unit/L    eGFR >60 mls/min/1.73/m2   Magnesium    Collection Time: 09/19/23  3:45 AM   Result Value Ref Range    Magnesium Level 2.10 1.60 - 2.60 mg/dL   Phosphorus    Collection Time: 09/19/23  3:45 AM   Result Value Ref Range    Phosphorus Level 4.7 2.3 - 4.7 mg/dL   Troponin I    Collection Time: 09/19/23  3:45 AM   Result Value Ref Range    Troponin-I <0.010 0.000 - 0.045 ng/mL   CK-MB    Collection Time: 09/19/23  3:45 AM   Result Value Ref Range    Creatine Kinase MB <1.0 <=3.4 ng/mL   APTT    Collection Time: 09/19/23  3:45 AM   Result Value Ref Range    PTT 24.9 23.2 - 33.7 seconds   Protime-INR    Collection Time: 09/19/23  3:45 AM   Result Value Ref Range    PT 12.7 12.5 - 14.5 seconds    INR 1.0 <=1.3   Hemoglobin A1C    Collection Time: 09/19/23  3:45 AM   Result Value Ref Range    Hemoglobin A1c 5.2 <=7.0 %    Estimated Average Glucose 102.5 mg/dL   CBC with Differential    Collection Time: 09/19/23  3:45 AM   Result Value Ref Range    WBC 5.89 4.50 - 11.50 x10(3)/mcL    RBC 4.54 4.20 - 5.40 x10(6)/mcL    Hgb 13.4 12.0 - 16.0 g/dL    Hct 39.9 37.0 - 47.0 %    MCV 87.9 80.0 - 94.0 fL    MCH 29.5 27.0 - 31.0 pg    MCHC 33.6 33.0 - 36.0 g/dL    RDW 13.1 11.5 - 17.0 %    Platelet 251 130 - 400 x10(3)/mcL    MPV 10.8 (H) 7.4 - 10.4 fL    Neut % 33.7 %    Lymph % 47.5 %    Mono % 11.2 %    Eos % 6.5 %    Basophil % 0.8 %    Lymph # 2.80 0.6 - 4.6 x10(3)/mcL    Neut # 1.98 (L) 2.1 - 9.2 x10(3)/mcL    Mono # 0.66 0.1 - 1.3 x10(3)/mcL    Eos # 0.38 0 - 0.9 x10(3)/mcL    Baso # 0.05 <=0.2 x10(3)/mcL    IG# 0.02 0 - 0.04 x10(3)/mcL    IG% 0.3 %    NRBC% 0.0 %   Comprehensive metabolic panel    Collection  Time: 09/20/23  4:07 AM   Result Value Ref Range    Sodium Level 139 136 - 145 mmol/L    Potassium Level 5.0 3.5 - 5.1 mmol/L    Chloride 109 (H) 98 - 107 mmol/L    Carbon Dioxide 23 22 - 29 mmol/L    Glucose Level 95 74 - 100 mg/dL    Blood Urea Nitrogen 12.1 7.0 - 18.7 mg/dL    Creatinine 0.86 0.55 - 1.02 mg/dL    Calcium Level Total 9.5 8.4 - 10.2 mg/dL    Protein Total 6.7 6.4 - 8.3 gm/dL    Albumin Level 3.7 3.5 - 5.0 g/dL    Globulin 3.0 2.4 - 3.5 gm/dL    Albumin/Globulin Ratio 1.2 1.1 - 2.0 ratio    Bilirubin Total 0.7 <=1.5 mg/dL    Alkaline Phosphatase 45 40 - 150 unit/L    Alanine Aminotransferase 21 0 - 55 unit/L    Aspartate Aminotransferase 18 5 - 34 unit/L    eGFR >60 mls/min/1.73/m2   CBC with Differential    Collection Time: 09/20/23  4:07 AM   Result Value Ref Range    WBC 6.27 4.50 - 11.50 x10(3)/mcL    RBC 4.65 4.20 - 5.40 x10(6)/mcL    Hgb 13.7 12.0 - 16.0 g/dL    Hct 40.6 37.0 - 47.0 %    MCV 87.3 80.0 - 94.0 fL    MCH 29.5 27.0 - 31.0 pg    MCHC 33.7 33.0 - 36.0 g/dL    RDW 13.0 11.5 - 17.0 %    Platelet 215 130 - 400 x10(3)/mcL    MPV 10.0 7.4 - 10.4 fL    Neut % 45.6 %    Lymph % 37.8 %    Mono % 10.0 %    Eos % 5.7 %    Basophil % 0.6 %    Lymph # 2.37 0.6 - 4.6 x10(3)/mcL    Neut # 2.85 2.1 - 9.2 x10(3)/mcL    Mono # 0.63 0.1 - 1.3 x10(3)/mcL    Eos # 0.36 0 - 0.9 x10(3)/mcL    Baso # 0.04 <=0.2 x10(3)/mcL    IG# 0.02 0 - 0.04 x10(3)/mcL    IG% 0.3 %    NRBC% 0.0 %       Physical Exam  Vitals and nursing note reviewed.   Constitutional:       Appearance: Normal appearance.   HENT:      Head: Normocephalic.      Mouth/Throat:      Mouth: Mucous membranes are moist.      Pharynx: Oropharynx is clear.   Cardiovascular:      Rate and Rhythm: Normal rate and regular rhythm.   Pulmonary:      Effort: Pulmonary effort is normal. No respiratory distress.   Abdominal:      Palpations: Abdomen is soft.   Musculoskeletal:         General: Normal range of motion.      Cervical back: Neck supple.    Skin:     General: Skin is warm and dry.   Neurological:      Mental Status: She is alert and oriented to person, place, and time. Mental status is at baseline.   Psychiatric:         Behavior: Behavior normal.       Current Inpatient Medications:    Current Facility-Administered Medications:     aspirin EC tablet 325 mg, 325 mg, Oral, Daily, Tj Young MD, 325 mg at 09/20/23 0910    atorvastatin tablet 40 mg, 40 mg, Oral, Daily, Tj Young MD, 40 mg at 09/20/23 0910    labetaloL injection 10 mg, 10 mg, Intravenous, Q6H PRN, Monique Tamez, AGAP-BC    VTE Risk Mitigation (From admission, onward)           Ordered     IP VTE HIGH RISK PATIENT  Once         09/18/23 1528     Place sequential compression device  Until discontinued         09/18/23 1528                  Assessment:   PFO on YA (9.20.23)  Acute Left Frontal Lobe Nonhemorrhagic CVA/Left Cerebellar Old Lacunar Infarct    - Presented with Right Arm Numbness  Menstrual Cycle Related Migraines    - OCP Use    Plan:   Continue Aspirin/Statin  Refer for Structural Heart Evaluation in outpatient setting for evaluation for PFO Closure  Fit for 2 Week MCT Monitor in outpatient setting Re: CVA Rule out AF/AFL  Follow up with Dr. Hood, will arrange appointment for early next week  YA Results discussed with Dr. Taylor  Will be available. Please call if needed.     Mak Lopes, [facility]  Cardiology  [facility] - Neurology  09/20/2023     Physician addendum:  I have seen and examined this patient as a split-shared visit with the BARBARA d/t complicated medical management of above problems written in assessment and high acuity requiring physician expertise in medical decision-making. I performed the substantive portion of the history and exam. Above medical decision-making is also formulated by me.    Cardiovascular exam:  S1, S2  Lungs:  Fine crackles at bases.  Extremities:  1+ Edema bilaterally    Plan:  YA showed PFO.  Patient found to  have recurrent stroke as per MRI.  There was a chronic ischemic changes also noted.  Patient to follow up with with Dr. Falk for evaluation for PFO closure.        Beto Taylor MD  Cardiologist

## 2023-09-20 NOTE — PROGRESS NOTES
[facility]  Hospital Medicine Progress Note        Chief Complaint: Inpatient Follow-up for     HPI:   Mrs Shaver is a 37 y.o. female with PMHx of migraines who presented to [facility] on 9/18/2023 with c/o right arm numbness that began around 7:00 a.m. on the morning of presentation.  She denied any headache, slurred speech, CP, SOB, nausea, vomiting, vision changes.  Patient reports the numbness has improved and is now only affecting the fingers on her right hand. Initial NIH of 1. Patient reports a history of menstrual migraines that were worse with her menstrual cycle for which she has been on OCPs since she was a teenager, she stopped when she had her children and resumed taking OCPs in 2016 due to recurrent migraines.     Initial ED vital signs include /85, HR 71, RR 19, SpO2 98% on room air, temperature 98.2° F.  Labs notable for triglycerides 148.  Urine pregnancy test negative.  CT head without contrast negative for acute intracranial abnormality, findings of mild microvascular ischemic disease noted.  MRI of the brain without contrast demonstrated left frontal lobe acute nonhemorrhagic lacunar infarcts, left cerebellar old lacunar infarct and very minimal chronic microvascular ischemia.  She was given aspirin 325 mg p.o. in the ED. admitted to hospital medicine services for further CVA workup. Echocardiogram showed EF 55-60%, mildly increased LV thickness. Repeat Limited Echo on 09/19 showed positive bubble study. Carotid US showed no significant stenosis. Patient planned for YA by [facility].     Interval Hx:   Today, Mrs Shaver stated she was doing well and had no new complaints. Reported improvement in R hand numbness. Neurology consulted [facility] for YA tomorrow. Will follow Neurology recommendations.    Case was discussed with patient's nurse on the floor.    Objective/physical exam:  General: alert lady lying comfortably in bed, in no acute distress  HENT: oral and oropharyngeal  mucosa moist, pink, with no erythema or exudates, no ear pain or discharge  Neck: normal neck movement, no lymph nodes or swellings, no JVD or Carotid bruit  Respiratory: clear breathing sounds bilaterally, no crackles, rales, ronchi or wheezes  Cardiovascular: clear S1 and S2, no murmurs, rubs or gallops  Peripheral Vascular: no lesions, ulcers or erosions, normal peripheral pulses and no pedal edema  Gastrointestinal: soft, non-tender, non-distended abdomen, no guarding, rigidity or rebound tenderness, normal bowel sounds  Integumentary: normal skin color, no rashes or lesions  Neuro: AAO x 3; motor strength 5/5 in B/L UEs & LEs; sensation intact to gross and fine touch B/L; CN II-XII grossly intact    VITAL SIGNS: 24 HRS MIN & MAX LAST   Temp  Min: 98.5 °F (36.9 °C)  Max: 98.8 °F (37.1 °C) 98.8 °F (37.1 °C)   BP  Min: 102/68  Max: 124/80 102/68   Pulse  Min: 72  Max: 79  76   Resp  Min: 18  Max: 19 19   SpO2  Min: 96 %  Max: 100 % 97 %     I have reviewed the following labs:  Recent Labs   Lab 09/18/23  0958 09/19/23  0345   WBC 6.06 5.89   RBC 4.81 4.54   HGB 14.2 13.4   HCT 42.1 39.9   MCV 87.5 87.9   MCH 29.5 29.5   MCHC 33.7 33.6   RDW 13.0 13.1    251   MPV 10.1 10.8*     Recent Labs   Lab 09/18/23  0958 09/19/23  0345   * 141   K 4.2 4.6   CO2 23 25   BUN 8.5 13.5   CREATININE 0.81 0.88   CALCIUM 9.7 9.3   MG 2.10 2.10   ALBUMIN 4.0 3.7   ALKPHOS 48 45   ALT 19 21   AST 19 21   BILITOT 0.6 0.4     Assessment/Plan:  Acute left frontal lobe nonhemorrhagic CVA  Left cerebellar old lacunar infarct  Hx of Menstrual Cycle Related Migraines, OCP use    Patient continues to be admitted for close monitoring  Reporting resolution of R hand numbness  Neurology on-board; following recs  [facility] consulted for YA due to positive bubble study on Echo  Continued on ASA 81 mg, Atorvastatin 40 mg; will change to  mg per Neuro recs  Continue monitoring symptoms  PT consulted; recommending DC home    VTE  prophylaxis: SCDs    Patient condition:  Stable    Anticipated discharge and Disposition:     Pending    All diagnosis and differential diagnosis have been reviewed; assessment and plan has been documented; I have personally reviewed the labs and test results that are presently available; I have reviewed the patients medication list; I have reviewed the consulting providers response and recommendations. I have reviewed or attempted to review medical records based upon their availability    All of the patient's questions have been  addressed and answered. Patient's is agreeable to the above stated plan. I will continue to monitor closely and make adjustments to medical management as needed.  _____________________________________________________________________    Radiology:  I have personally reviewed the following imaging and agree with the radiologist.     US Thyroid  Narrative: EXAMINATION:  US THYROID    CLINICAL HISTORY:  thyroid nodule seen on CTA;.    TECHNIQUE:  Ultrasound of the thyroid and cervical lymph nodes was performed.    COMPARISON:  CTA 09/18/2023    FINDINGS:  SIZE:    Right lobe: 5.4 x 2.1 x 1.9 cm    Left lobe: 4.7 x 1.9 x 1.7 cm    Isthmus: 0.3 cm    PARENCHYMA:    Mildly heterogeneous parenchyma.    NODULES:    Hypoechoic nodule at the mid left lobe measures 1.3 cm (TR 4).    Isoechoic nodule at the lateral mid left lobe measures 1.7 cm (TR 3).    LYMPH NODES:    No enlarged lymph nodes seen.  Impression: Multinodular goiter.  Recommend 1 year follow-up..    Reference: ACR Thyroid Imaging, Reporting and Data System (TI-RADS): White Paper of the ACR TI-RADS Committee.  2017.    TR3.  Mildly suspicious.  FNA if 2.5 cm or greater.  Follow up at 1, 3 and 5 years if >/= 1.5 cm    TR4.  Moderately suspicious.  FNA if 1.5 cm or greater.  Follow up at 1, 2, 3, and 5 years if 1 cm or greater.    Electronically signed by: Jaimie Larson  Date:    09/19/2023  Time:    12:23  CV Ultrasound Bilateral  Doppler Carotid  Bilateral Internal Carotid arteries show no significant stenosis.  Bilateral Vertebral arteries show antegrade flow.  Echo Saline Bubble? Yes    Limited to repeat bubble study. Bubble study is positive for   intracardia shunt grade 3.  Echo    Left Ventricle: The left ventricle is normal in size. Mildly increased   wall thickness. There is normal systolic function with a visually   estimated ejection fraction of 55 - 60%. There is normal diastolic   function.    Right Ventricle: Normal right ventricular cavity size.    IVC/SVC: Normal venous pressure at 3 mmHg.      jT Young MD   09/19/2023

## 2023-09-20 NOTE — ANESTHESIA PREPROCEDURE EVALUATION
"                                                                                                             09/20/2023  Sharon Shaver is a 37 y.o., female.    asa4 for recent CVA < 3 months    "Ms. Shaver is a 37 year old female who is unknown to [facility]. She presents to the ER on 9.18.23 with complaints of right arm numbness that began earlier that morning. She denies CP, SOB, palpitations, nausea, vomiting, HA, slurred speech, or vision changes. She reports improvement in the numbness. 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 She has since resumed taking them since 2016. Her lab work is significant for d-dimer 0.53 and triglycerides. An MRI of the brain was obtained and demonstrated left frontal acute nonhemorrhagic lacunar infarcts and left cerebellar old lacunar infarct. A CTA head and neck was obtained and negative for large vessel occlusion and multiple thyroid nodules seen with 1 hypervascular nodule in the left thyroid. An echo was obtained and demonstrated a positive bubble study. Neuro is on board with recommendations for YA to further evaluate positive bubble study, which is the reason [facility] has been consulted."    13 / 40 / 215k  Na 139, K 5.0, Cr 0.86    Pre-op Assessment    I have reviewed the Patient Summary Reports.     I have reviewed the Nursing Notes. I have reviewed the NPO Status.   I have reviewed the Medications.     Review of Systems  Anesthesia Hx:   Denies Personal Hx of Anesthesia complications.   Cardiovascular:   Exercise tolerance: good    Pulmonary:  Pulmonary Normal    Hepatic/GI:  Hepatic/GI Normal    Musculoskeletal:  Musculoskeletal Normal    Neurological:   CVA, residual symptoms    Psych:  Psychiatric Normal           Physical Exam  General: Well nourished, Cooperative and Alert    Airway:  Mallampati: II   Mouth Opening: Normal  TM Distance: Normal  Tongue: Normal    Chest/Lungs:  Normal Respiratory " Rate    Heart:  Rate: Normal        Anesthesia Plan  Type of Anesthesia, risks & benefits discussed:    Anesthesia Type: Gen Natural Airway  Intra-op Monitoring Plan: Standard ASA Monitors  Post Op Pain Control Plan: multimodal analgesia  Induction:  IV  Informed Consent: Informed consent signed with the Patient and all parties understand the risks and agree with anesthesia plan.  All questions answered.   ASA Score: 4  Day of Surgery Review of History & Physical: H&P Update referred to the surgeon/provider.    Ready For Surgery From Anesthesia Perspective.     .

## 2023-09-22 ENCOUNTER — PATIENT MESSAGE (OUTPATIENT)
Dept: ADMINISTRATIVE | Facility: OTHER | Age: 38
End: 2023-09-22
Payer: COMMERCIAL

## 2023-09-22 LAB — MAYO GENERIC ORDERABLE RESULT: NORMAL

## 2023-09-23 ENCOUNTER — PATIENT MESSAGE (OUTPATIENT)
Dept: ADMINISTRATIVE | Facility: OTHER | Age: 38
End: 2023-09-23
Payer: COMMERCIAL

## 2023-10-04 DIAGNOSIS — Z86.73 HISTORY OF CVA (CEREBROVASCULAR ACCIDENT): ICD-10-CM

## 2023-10-04 DIAGNOSIS — D68.59 HYPERCOAGULABLE STATE: Primary | ICD-10-CM

## 2023-10-05 ENCOUNTER — PATIENT MESSAGE (OUTPATIENT)
Dept: ADMINISTRATIVE | Facility: OTHER | Age: 38
End: 2023-10-05
Payer: COMMERCIAL

## 2023-10-11 ENCOUNTER — OFFICE VISIT (OUTPATIENT)
Dept: HEMATOLOGY/ONCOLOGY | Facility: CLINIC | Age: 38
End: 2023-10-11
Payer: COMMERCIAL

## 2023-10-11 VITALS
BODY MASS INDEX: 28.94 KG/M2 | OXYGEN SATURATION: 99 % | DIASTOLIC BLOOD PRESSURE: 76 MMHG | HEIGHT: 64 IN | SYSTOLIC BLOOD PRESSURE: 111 MMHG | WEIGHT: 169.5 LBS | RESPIRATION RATE: 18 BRPM | TEMPERATURE: 98 F | HEART RATE: 74 BPM

## 2023-10-11 DIAGNOSIS — D68.59 HYPERCOAGULABLE STATE: ICD-10-CM

## 2023-10-11 DIAGNOSIS — Z86.73 HISTORY OF CVA (CEREBROVASCULAR ACCIDENT): ICD-10-CM

## 2023-10-11 LAB
(HCYS)2 SERPL-MCNC: 6 UMOL/L (ref 5.1–15.4)
FACT VIII ACT/NOR PPP: 183 % (ref 59–191)
TT IMM BOVINE THROMBIN PPP: 17 SECONDS (ref 0–22)

## 2023-10-11 PROCEDURE — 85670 THROMBIN TIME PLASMA: CPT | Performed by: INTERNAL MEDICINE

## 2023-10-11 PROCEDURE — 85306 CLOT INHIBIT PROT S FREE: CPT | Performed by: INTERNAL MEDICINE

## 2023-10-11 PROCEDURE — 85240 CLOT FACTOR VIII AHG 1 STAGE: CPT | Performed by: INTERNAL MEDICINE

## 2023-10-11 PROCEDURE — 1111F DSCHRG MED/CURRENT MED MERGE: CPT | Mod: CPTII,S$GLB,, | Performed by: INTERNAL MEDICINE

## 2023-10-11 PROCEDURE — 3008F BODY MASS INDEX DOCD: CPT | Mod: CPTII,S$GLB,, | Performed by: INTERNAL MEDICINE

## 2023-10-11 PROCEDURE — 3074F SYST BP LT 130 MM HG: CPT | Mod: CPTII,S$GLB,, | Performed by: INTERNAL MEDICINE

## 2023-10-11 PROCEDURE — 85303 CLOT INHIBIT PROT C ACTIVITY: CPT | Performed by: INTERNAL MEDICINE

## 2023-10-11 PROCEDURE — 1111F PR DISCHARGE MEDS RECONCILED W/ CURRENT OUTPATIENT MED LIST: ICD-10-PCS | Mod: CPTII,S$GLB,, | Performed by: INTERNAL MEDICINE

## 2023-10-11 PROCEDURE — 3008F PR BODY MASS INDEX (BMI) DOCUMENTED: ICD-10-PCS | Mod: CPTII,S$GLB,, | Performed by: INTERNAL MEDICINE

## 2023-10-11 PROCEDURE — 85613 RUSSELL VIPER VENOM DILUTED: CPT | Performed by: INTERNAL MEDICINE

## 2023-10-11 PROCEDURE — 99999 PR PBB SHADOW E&M-EST. PATIENT-LVL IV: ICD-10-PCS | Mod: PBBFAC,,, | Performed by: INTERNAL MEDICINE

## 2023-10-11 PROCEDURE — 36415 COLL VENOUS BLD VENIPUNCTURE: CPT | Performed by: INTERNAL MEDICINE

## 2023-10-11 PROCEDURE — 3078F DIAST BP <80 MM HG: CPT | Mod: CPTII,S$GLB,, | Performed by: INTERNAL MEDICINE

## 2023-10-11 PROCEDURE — 99204 PR OFFICE/OUTPT VISIT, NEW, LEVL IV, 45-59 MIN: ICD-10-PCS | Mod: S$GLB,,, | Performed by: INTERNAL MEDICINE

## 2023-10-11 PROCEDURE — 1159F MED LIST DOCD IN RCRD: CPT | Mod: CPTII,S$GLB,, | Performed by: INTERNAL MEDICINE

## 2023-10-11 PROCEDURE — 99204 OFFICE O/P NEW MOD 45 MIN: CPT | Mod: S$GLB,,, | Performed by: INTERNAL MEDICINE

## 2023-10-11 PROCEDURE — 1160F PR REVIEW ALL MEDS BY PRESCRIBER/CLIN PHARMACIST DOCUMENTED: ICD-10-PCS | Mod: CPTII,S$GLB,, | Performed by: INTERNAL MEDICINE

## 2023-10-11 PROCEDURE — 3044F HG A1C LEVEL LT 7.0%: CPT | Mod: CPTII,S$GLB,, | Performed by: INTERNAL MEDICINE

## 2023-10-11 PROCEDURE — 86146 BETA-2 GLYCOPROTEIN ANTIBODY: CPT | Performed by: INTERNAL MEDICINE

## 2023-10-11 PROCEDURE — 3074F PR MOST RECENT SYSTOLIC BLOOD PRESSURE < 130 MM HG: ICD-10-PCS | Mod: CPTII,S$GLB,, | Performed by: INTERNAL MEDICINE

## 2023-10-11 PROCEDURE — 3044F PR MOST RECENT HEMOGLOBIN A1C LEVEL <7.0%: ICD-10-PCS | Mod: CPTII,S$GLB,, | Performed by: INTERNAL MEDICINE

## 2023-10-11 PROCEDURE — 81240 F2 GENE: CPT | Performed by: INTERNAL MEDICINE

## 2023-10-11 PROCEDURE — 83090 ASSAY OF HOMOCYSTEINE: CPT | Performed by: INTERNAL MEDICINE

## 2023-10-11 PROCEDURE — 1160F RVW MEDS BY RX/DR IN RCRD: CPT | Mod: CPTII,S$GLB,, | Performed by: INTERNAL MEDICINE

## 2023-10-11 PROCEDURE — 1159F PR MEDICATION LIST DOCUMENTED IN MEDICAL RECORD: ICD-10-PCS | Mod: CPTII,S$GLB,, | Performed by: INTERNAL MEDICINE

## 2023-10-11 PROCEDURE — 3078F PR MOST RECENT DIASTOLIC BLOOD PRESSURE < 80 MM HG: ICD-10-PCS | Mod: CPTII,S$GLB,, | Performed by: INTERNAL MEDICINE

## 2023-10-11 PROCEDURE — 99999 PR PBB SHADOW E&M-EST. PATIENT-LVL IV: CPT | Mod: PBBFAC,,, | Performed by: INTERNAL MEDICINE

## 2023-10-11 PROCEDURE — 81241 F5 GENE: CPT | Performed by: INTERNAL MEDICINE

## 2023-10-11 PROCEDURE — 86147 CARDIOLIPIN ANTIBODY EA IG: CPT | Performed by: INTERNAL MEDICINE

## 2023-10-11 NOTE — PROGRESS NOTES
HEMATOLOGY/ONCOLOGY OFFICE CLINIC VISIT    Visit Information:    Initial Evaluation: 10/11/2023  Referring Provider: Dr Hood  Other providers:  Code status: Not addressed    Diagnosis:  TIA    Present treatment:   mg po daily    Treatment/Oncology history:    Plan of care:     Imaging:  CT Head 9/18/2023: No acute intracranial hemorrhage, edema or mass. No acute parenchymal abnormality. Mild cerebral atrophy with concordant ventricular enlargement. No acute intracranial abnormality identified.  Findings of mild microvascular ischemic disease  Mri Brain 9/18/2023: 1.  Left frontal lobe acute nonhemorrhagic lacunar infarcts. 2.  Left cerebellar old lacunar infarct and very minimal chronic microvascular ischemia.  US thyroid 9/19/2023: Multinodular goiter.  Recommend 1 year follow-up..    Limited TTE 9.19.23: Limited to repeat bubble study. 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   TTE 9.19.23: Left Ventricle: The left ventricle is normal in size. Mildly increased wall thickness. There is normal systolic function with a visually estimated ejection fraction of 55 - 60%. There is normal diastolic function. Right Ventricle: Normal right ventricular cavity size.IVC/SVC: Normal venous pressure at 3 mmHg.      Pathology:      CLINICAL HISTORY:       Patient: Sharon Shaver is a 37 y.o. female without significant past medical history kindly referred for evaluation of hypercoagulable state.  She initially presented to the Emergency room on 09/18/2023 with right arm numbness. Her lab work is significant for d-dimer 0.53 and triglycerides. An MRI of the brain demonstrated left frontal acute nonhemorrhagic lacunar infarcts and left cerebellar old lacunar infarct. A CTA head and neck was negative for large vessel occlusion and multiple thyroid nodules seen with 1 hypervascular nodule in the left thyroid. An echo was obtained and demonstrated a positive bubble study.  He was evaluated by cardiologist what she  was in the hospital.  Echo was repeated it was essentially normal.  She was placed on 325 mg of aspirin daily and so far tolerating well.    No family h/o of blood clots. She suffers of menstrual related headaches since she was 10 yr old for which she was started on oral contraceptive pills. She was on BCP and stopped the day of TIA. She denies CP, SOB, palpitations, nausea, vomiting, HA, slurred speech, or vision changes.  Neurological deficit at this time.    Chief Complaint: OTHER (NP referred by DR. Torey Hood for Hypercoag w/u.)      Interval History:        History reviewed. No pertinent past medical history.   Past Surgical History:   Procedure Laterality Date     SECTION       Family History   Problem Relation Age of Onset    Hypertension Mother     COPD Mother     Atrial fibrillation Mother     Pacemaker/defibrilator Father     Congenital heart disease Maternal Grandmother     Hypertension Paternal Grandmother      Social Connections: Not on file       Review of patient's allergies indicates:   Allergen Reactions    Sulfa (sulfonamide antibiotics) Hives      Current Outpatient Medications on File Prior to Visit   Medication Sig Dispense Refill    aspirin (ECOTRIN) 325 MG EC tablet Take 1 tablet (325 mg total) by mouth once daily. 30 tablet 3    atorvastatin (LIPITOR) 40 MG tablet Take 1 tablet (40 mg total) by mouth once daily. 90 tablet 3     No current facility-administered medications on file prior to visit.      Review of Systems   Constitutional:  Negative for activity change, appetite change, chills, fatigue, fever and unexpected weight change.   HENT:  Negative for mouth dryness, mouth sores, nosebleeds, sore throat and trouble swallowing.    Eyes:  Negative for visual disturbance.   Respiratory:  Negative for cough and shortness of breath.    Cardiovascular:  Negative for chest pain, palpitations and leg swelling.   Gastrointestinal:  Negative for abdominal distention, abdominal pain,  blood in stool, change in bowel habit, constipation, diarrhea, nausea and vomiting.   Endocrine: Negative.    Genitourinary:  Negative for dysuria, frequency, hematuria and urgency.   Musculoskeletal:  Negative for arthralgias, back pain, myalgias and neck pain.   Integumentary:  Negative for rash.   Neurological:  Positive for headaches (with menstrual cycles). Negative for dizziness, tremors, syncope, speech difficulty, weakness, light-headedness, numbness and memory loss.   Hematological:  Negative for adenopathy. Does not bruise/bleed easily.   Psychiatric/Behavioral:  Negative for confusion and suicidal ideas. The patient is not nervous/anxious.               Vitals:    10/11/23 1349   Weight: 76.9 kg (169 lb 8 oz)      Wt Readings from Last 6 Encounters:   10/11/23 76.9 kg (169 lb 8 oz)   09/18/23 75.8 kg (167 lb)     There is no height or weight on file to calculate BMI.  There is no height or weight on file to calculate BSA.  Physical Exam  Vitals and nursing note reviewed.   Constitutional:       General: She is not in acute distress.     Appearance: Normal appearance. She is well-developed.   HENT:      Head: Normocephalic and atraumatic.      Mouth/Throat:      Mouth: Mucous membranes are moist.   Eyes:      General: No scleral icterus.     Extraocular Movements: Extraocular movements intact.      Conjunctiva/sclera: Conjunctivae normal.      Pupils: Pupils are equal, round, and reactive to light.   Neck:      Vascular: No JVD.   Cardiovascular:      Rate and Rhythm: Normal rate and regular rhythm.      Heart sounds: No murmur heard.  Pulmonary:      Effort: Pulmonary effort is normal.      Breath sounds: Normal breath sounds. No wheezing or rhonchi.   Abdominal:      General: Bowel sounds are normal. There is no distension.      Palpations: Abdomen is soft. There is no mass.      Tenderness: There is no abdominal tenderness.   Musculoskeletal:         General: No swelling or deformity.      Cervical  back: Neck supple.   Lymphadenopathy:      Head:      Right side of head: No submandibular adenopathy.      Left side of head: No submandibular adenopathy.      Cervical: No cervical adenopathy.      Upper Body:      Right upper body: No supraclavicular or axillary adenopathy.      Left upper body: No supraclavicular or axillary adenopathy.      Lower Body: No right inguinal adenopathy. No left inguinal adenopathy.   Skin:     General: Skin is warm.      Coloration: Skin is not jaundiced.      Findings: No lesion or rash.      Nails: There is no clubbing.   Neurological:      General: No focal deficit present.      Mental Status: She is alert and oriented to person, place, and time.      Cranial Nerves: Cranial nerves 2-12 are intact.   Psychiatric:         Attention and Perception: Attention normal.         Behavior: Behavior is cooperative.         Judgment: Judgment normal.       Laboratory:  CBC with Differential:  Lab Results   Component Value Date    WBC 6.27 09/20/2023    RBC 4.65 09/20/2023    HGB 13.7 09/20/2023    HCT 40.6 09/20/2023    MCV 87.3 09/20/2023    MCH 29.5 09/20/2023    MCHC 33.7 09/20/2023    RDW 13.0 09/20/2023     09/20/2023    MPV 10.0 09/20/2023        CMP:  Sodium Level   Date Value Ref Range Status   09/20/2023 139 136 - 145 mmol/L Final     Potassium Level   Date Value Ref Range Status   09/20/2023 5.0 3.5 - 5.1 mmol/L Final     Carbon Dioxide   Date Value Ref Range Status   09/20/2023 23 22 - 29 mmol/L Final     Blood Urea Nitrogen   Date Value Ref Range Status   09/20/2023 12.1 7.0 - 18.7 mg/dL Final     Creatinine   Date Value Ref Range Status   09/20/2023 0.86 0.55 - 1.02 mg/dL Final     Calcium Level Total   Date Value Ref Range Status   09/20/2023 9.5 8.4 - 10.2 mg/dL Final     Albumin Level   Date Value Ref Range Status   09/20/2023 3.7 3.5 - 5.0 g/dL Final     Bilirubin Total   Date Value Ref Range Status   09/20/2023 0.7 <=1.5 mg/dL Final     Alkaline Phosphatase   Date  Value Ref Range Status   09/20/2023 45 40 - 150 unit/L Final     Aspartate Aminotransferase   Date Value Ref Range Status   09/20/2023 18 5 - 34 unit/L Final     Alanine Aminotransferase   Date Value Ref Range Status   09/20/2023 21 0 - 55 unit/L Final     Estimated GFR-Non    Date Value Ref Range Status   11/11/2019 >60 mL/min/1.73 m2 Final            Assessment:       1. Hypercoagulable state    2. History of CVA (cerebrovascular accident)      1) TIA:   ---Acute Left Frontal Lobe Nonhemorrhagic CVA  ---Left Cerebellar Old Lacunar Infarct    Discussed with the patient that TIA is a transient episode of neurologic dysfunction caused by focal brain, spinal cord, or retinal ischemia, without acute infarction. Patients with transient ischemic attack (TIA) or minor (ie, nondisabling) stroke are at increased risk of recurrent stroke and therefore require urgent evaluation and treatment since immediate intervention may substantially reduce the risk of recurrent stroke.    Hypercoagulable states had an increased risk for blood clots developing in the arteries and veins. Blood clots in the veins or venous system can travel through the bloodstream and cause deep vein thrombosis (a blood clot in the veins of the pelvis, leg, arm, liver, intestines or kidneys) or a pulmonary embolus (blood clot in the lungs). Blood clots in the arteries can increase the risk for stroke, heart attack, severe leg pain, difficulty walking, or even the loss of a limb. Hypercoagulable states are usually genetic (inherited) or acquired conditions.  Also discussed that the frequency of multiple factors can act concurrently to increase the frequency of hypercoagulability. Mild to moderate hyperhomocysteinemia is an independent risk factor for stroke, myocardial infarction, peripheral arterial disease. There is now convincing evidence that a high level of coagulation factor VIII is an important risk factor for venous thromboembolism. A  factor VIII plasma concentration above 1500 IU/l is associated with an almost 5-fold risk for a first episode of venous thrombosis. In thrombosis patients high factor VIII has been shown to persist over time and is not related to an acute phase reaction. High factor VIII is also an important risk factor for recurrence of venous thrombosis. Elevation of factor VIII is associated with higher risk of large vessel arterial occlusions including stroke  Classic acquired risk factors for venous thrombosis include trauma, immobilization, sedentary lifestyle, long traveling, pregnancy, surgery, malignancy, morbid obesity and infection. These are all factors that may cause tissue damage, stasis of the blood, or changes in blood composition.           Plan:       Young female recently diagnosed with TIA when she presented with right-sided weakness.  Hypercoagulable state workup is indicated at this time.    RTC 2 weeks to discuss results--can be virtual  Continue aspirin  Continue to hold oral contraceptive pills  The patient was seen, interviewed and examined. Pertinent lab and radiology studies were reviewed.   The patient was given ample opportunity to ask questions, and to the best of my abilities, all questions answered to satisfaction; patient demonstrated understanding of what we discussed and agreeable to the plan. Pt instructed to call should develop concerning signs/symptoms or have further questions.     I'd like to thank for referring and allowing me the opportunity to participate in the care of this patient and if any questions, please do not hesitate to call the office at [phone].       Michelle Rankin MD  Hematology/Oncology

## 2023-10-12 ENCOUNTER — PATIENT MESSAGE (OUTPATIENT)
Dept: ADMINISTRATIVE | Facility: OTHER | Age: 38
End: 2023-10-12
Payer: COMMERCIAL

## 2023-10-12 LAB
B2 GLYCOPROT1 IGG SERPL IA-ACNC: <9.4 SGU
B2 GLYCOPROT1 IGM SERPL IA-ACNC: <9.4 SMU
PROT C ACT/NOR PPP CHRO: 119 % (ref 70–150)
PROT S FREE AG ACT/NOR PPP IA: 81 % (ref 50–160)

## 2023-10-13 LAB — MAYO GENERIC ORDERABLE RESULT: NORMAL

## 2023-10-14 LAB
CARDIOLIPIN IGG QUANT (OHS): 1.1 GPL U/ML
CARDIOLIPIN IGM QUANT (OHS): 5.1 MPL U/ML

## 2023-10-16 LAB
COAGULATION SPECIALIST REVIEW: NORMAL
F2 C.20210G>A GENO BLD/T: NEGATIVE
PROVIDER SIGNING NAME: NORMAL

## 2023-10-18 LAB
COAGULATION SPECIALIST REVIEW: NORMAL
DRVV CONF RATIO (OHS): 1.06
DRVV NORM RATIO (OHS): 1.02 (ref 0–1.19)
DRVV SCR RATIO (OHS): 1.08
F5 P.R506Q BLD/T QL: NEGATIVE
L.A. PATH INTERP (OHS): NORMAL
LA ST CALC (OHS): 2.1 SECONDS (ref 0–7.9)
PROVIDER SIGNING NAME: NORMAL

## 2023-10-19 NOTE — DISCHARGE SUMMARY
[facility]  Hospital Medicine Discharge Summary    Admit Date: 9/18/2023  Discharge Date and Time: 09/20/2023  Admitting Physician:  Team  Discharging Physician: Tj Young MD.  Primary Care Physician: Jennifer, Primary Doctor  Consults: Cardiology, Hospital Medicine, and Neurology    Discharge Diagnoses:  Acute left frontal lobe nonhemorrhagic CVA  Left cerebellar old lacunar infarct  Hx of Menstrual Cycle Related Migraines, OCP use    Hospital Course:   Mrs Shaver is a 37 y.o. female with PMHx of migraines who presented to [facility] on 9/18/2023 with c/o right arm numbness that began around 7:00 a.m. on the morning of presentation.  She denied any headache, slurred speech, CP, SOB, nausea, vomiting, vision changes.  Patient reports the numbness has improved and is now only affecting the fingers on her right hand. Initial NIH of 1. Patient reports a history of menstrual migraines that were worse with her menstrual cycle for which she has been on OCPs since she was a teenager, she stopped when she had her children and resumed taking OCPs in 2016 due to recurrent migraines.     Initial ED vital signs include /85, HR 71, RR 19, SpO2 98% on room air, temperature 98.2° F.  Labs notable for triglycerides 148.  Urine pregnancy test negative.  CT head without contrast negative for acute intracranial abnormality, findings of mild microvascular ischemic disease noted.  MRI of the brain without contrast demonstrated left frontal lobe acute nonhemorrhagic lacunar infarcts, left cerebellar old lacunar infarct and very minimal chronic microvascular ischemia.  She was given aspirin 325 mg p.o. in the ED. admitted to hospital medicine services for further CVA workup. Echocardiogram showed EF 55-60%, mildly increased LV thickness. Repeat Limited Echo on 09/19 showed positive bubble study. Carotid US showed no significant stenosis. Patient planned for YA by [facility] which was done on 09/20. YA showed large  "PFO, EF 55-60%, no intracardiac thrombus. Patient to be referred for PFO closure outpatient. PT/OT worked with patient; recommended DC home.    Pt was seen and examined on the day of discharge. Mrs Shaver stated she was doing well and had no new complaints. Reported continued improvement in R hand numbness. Planned for DC today.    Vitals:  VITAL SIGNS: 24 HRS MIN & MAX LAST   No data recorded 97.5 °F (36.4 °C)   No data recorded 112/76   No data recorded  68   No data recorded 18   No data recorded 95 %       Physical Exam:  General: alert lady lying comfortably in bed, in no acute distress  HENT: oral and oropharyngeal mucosa moist, pink, with no erythema or exudates, no ear pain or discharge  Neck: normal neck movement, no lymph nodes or swellings, no JVD or Carotid bruit  Respiratory: clear breathing sounds bilaterally, no crackles, rales, ronchi or wheezes  Cardiovascular: clear S1 and S2, no murmurs, rubs or gallops  Peripheral Vascular: no lesions, ulcers or erosions, normal peripheral pulses and no pedal edema  Gastrointestinal: soft, non-tender, non-distended abdomen, no guarding, rigidity or rebound tenderness, normal bowel sounds  Integumentary: normal skin color, no rashes or lesions  Neuro: AAO x 3; motor strength 5/5 in B/L UEs & LEs; sensation intact to gross and fine touch B/L; CN II-XII grossly intact    Procedures Performed: No admission procedures for hospital encounter.     Significant Diagnostic Studies: See Full reports for all details    No results for input(s): "WBC", "RBC", "HGB", "HCT", "MCV", "MCH", "MCHC", "RDW", "PLT", "MPV", "GRAN", "LYMPH", "MONO", "BASO", "NRBC" in the last 168 hours.    No results for input(s): "NA", "K", "CL", "CO2", "ANIONGAP", "BUN", "CREATININE", "GLU", "CALCIUM", "PH", "MG", "ALBUMIN", "PROT", "ALKPHOS", "ALT", "AST", "BILITOT" in the last 168 hours.     Microbiology Results (last 7 days)       ** No results found for the last 168 hours. **         "     Transesophageal echo (YA)  Procedure: YA     Final impression:    LV systolic function 55-60%.  No intracardiac thrombus is present.  Large PFO noted with several bubble crossing on first beat  No significant valvular abnormalities.     Recommendations:    PFO closure for recurrent stroke.     Indication: Recurrent CVA    Informed consent: obtained, signed copy placed in the chart.    Description of the procedure: After sedation was achieved (please see   anesthesia report for details), the esophagus intubated without   difficulties. Multiple orthogonal views obtained. Patient tolerated   procedure without immediate complications noted.    Findings:  Left atrium (LA): Normal LA size.  Left atrial appendage (MARKY): No MARKY thrombus is present.  Interatrial septum:  + ASD or PFO noted on 2D or Color Doppler images.   Bubble study performed was positive for large PFO noted with several   bubble crossing on first beat  Right atrium (RA): Normal RA size.  Left ventricle (LV): LVEF 55-60%.   Normal LV size.  Normal LV wall   thickness. No Regional wall motion abnormalities noted.  Right ventricle (RV): Partially visualized in this study.  Aortic valve: Trileaflet.  No Aortic stenosis.   No aortic regurgitation.   No Vegetation is present.  Mitral valve: No mitral annular calcification is present.  No Mitral   stenosis.  No Mitral valve prolapse. Trace Mitral regurgitation. No   Vegetation is present.  Tricuspid valve: Partially visualized, No Tricuspid stenosis. Trace   Tricuspid regurgitation. No Vegetation is present.  Pulmonic valve: Not well visualized, no hemodynamically significant   pulmonic stenosis, no pulmonic regurgitation noted in this study. No   Vegetation is present.  Thoracic aorta: No Atherosclerotic changes of the descending thoracic   aorta.  Normal Caliber aortic root.   Normal Caliber Proximal portion of   the ascending aorta.   Pericardium: No significant pericardial effusion is present.          Medication List        START taking these medications      aspirin 325 MG EC tablet  Commonly known as: ECOTRIN  Take 1 tablet (325 mg total) by mouth once daily.     atorvastatin 40 MG tablet  Commonly known as: LIPITOR  Take 1 tablet (40 mg total) by mouth once daily.               Where to Get Your Medications        You can get these medications from any pharmacy    Bring a paper prescription for each of these medications  aspirin 325 MG EC tablet  atorvastatin 40 MG tablet          Explained in detail to the patient about the discharge plan, medications, and follow-up visits. Pt understands and agrees with the treatment plan  Discharge Disposition: Home or Self Care   Discharged Condition: stable  Diet-    Medications Per DC med rec  Activities as tolerated   Follow-up Information       Nas Wise MD Follow up in 2 week(s).    Specialties: Neurology, Interventional Neurology  Why: Follow up in stroke clinic with Dr. Wise within 2-4 weeks of hospital discharge  Contact information:  [address] [place] [address]  [address]  [facility] [address]  [phone]               Torey Hood MD Follow up on 9/26/2023.    Specialty: Cardiology  Why: @ 9:50AM structual heart follow up  Contact information:  [address] [facility] [address]  [phone]               Torey Hood MD Follow up on 9/28/2023.    Specialty: Cardiology  Why: @ 2:00PM for heart monitor placement.  Contact information:  [address] [facility] [address]  [phone]                           For further questions contact hospitalist office    Discharge time 33 minutes    For worsening symptoms, chest pain, shortness of breath, increased abdominal pain, high grade fever, stroke or stroke like symptoms, immediately go to the nearest Emergency Room or call 911 as soon as possible.      Tj Combs M.D.

## 2023-10-25 ENCOUNTER — OFFICE VISIT (OUTPATIENT)
Dept: HEMATOLOGY/ONCOLOGY | Facility: CLINIC | Age: 38
End: 2023-10-25
Payer: COMMERCIAL

## 2023-10-25 DIAGNOSIS — I63.9 CEREBROVASCULAR ACCIDENT (CVA), UNSPECIFIED MECHANISM: Primary | ICD-10-CM

## 2023-10-25 PROCEDURE — 99213 OFFICE O/P EST LOW 20 MIN: CPT | Mod: 95,,, | Performed by: INTERNAL MEDICINE

## 2023-10-25 PROCEDURE — 3044F HG A1C LEVEL LT 7.0%: CPT | Mod: CPTII,95,, | Performed by: INTERNAL MEDICINE

## 2023-10-25 PROCEDURE — 1159F MED LIST DOCD IN RCRD: CPT | Mod: CPTII,95,, | Performed by: INTERNAL MEDICINE

## 2023-10-25 PROCEDURE — 1159F PR MEDICATION LIST DOCUMENTED IN MEDICAL RECORD: ICD-10-PCS | Mod: CPTII,95,, | Performed by: INTERNAL MEDICINE

## 2023-10-25 PROCEDURE — 99213 PR OFFICE/OUTPT VISIT, EST, LEVL III, 20-29 MIN: ICD-10-PCS | Mod: 95,,, | Performed by: INTERNAL MEDICINE

## 2023-10-25 PROCEDURE — 3044F PR MOST RECENT HEMOGLOBIN A1C LEVEL <7.0%: ICD-10-PCS | Mod: CPTII,95,, | Performed by: INTERNAL MEDICINE

## 2023-10-25 NOTE — PROGRESS NOTES
HEMATOLOGY/ONCOLOGY OFFICE CLINIC VISIT      This is a telemedicine note.  Patient was treated using telemedicine, real-time audio and video, according to [facility] protocols.      IDr. Michelle, distant provided, conducted the visit from location identified below.  The patient participated in the visit at a non-[facility] location selected by the patient (or patient's representative), identified below.  I am licensed in the [facility] where the patient stated they are located.  The patient, (or patient's representative) stated that they understood and accepted privacy and security risk to the information and her location.   I have reviewed the patient name and date of birth  I have verbally reviewed the past medical history, active medication list, and allergies with the patient (parent/ legal guardian for a patient under the age of 18 years old) and verified with picture ID if applicable . I have verified that this patient is a resident in the [facility].  I have verbally obtained review of systems    Patient was located in the [facility]  I, Dr. Michelle Rankin, distant provider, was located at [facility] .    Face-to-face time spent with the patient exceeds 25 minutes, over 50% of which was used for education and counseling regarding medical conditions, current medications including risk/benefits and side effects/adverse events, over-the-counter medications uses/doses, home self-care and contact precautions, red flags and indication for immediate medical attention.  The patient is receptive, expresses understanding and is agreeable to the plan.  All questions answered.        Visit Information:    Initial Evaluation: 10/11/2023  Referring Provider: Dr Hood  Other providers: Dr Ema Castañeda  (PCP)  Code status: Not addressed     Diagnosis:  TIA     Present treatment:   mg po daily     Treatment/Oncology history:     Plan of care:      Imaging:  CT Head 9/18/2023: No acute  intracranial hemorrhage, edema or mass. No acute parenchymal abnormality. Mild cerebral atrophy with concordant ventricular enlargement. No acute intracranial abnormality identified.  Findings of mild microvascular ischemic disease  Mri Brain 9/18/2023: 1.  Left frontal lobe acute nonhemorrhagic lacunar infarcts. 2.  Left cerebellar old lacunar infarct and very minimal chronic microvascular ischemia.  US thyroid 9/19/2023: Multinodular goiter.  Recommend 1 year follow-up..    Limited TTE 9.19.23: Limited to repeat bubble study. 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   TTE 9.19.23: Left Ventricle: The left ventricle is normal in size. Mildly increased wall thickness. There is normal systolic function with a visually estimated ejection fraction of 55 - 60%. There is normal diastolic function. Right Ventricle: Normal right ventricular cavity size.IVC/SVC: Normal venous pressure at 3 mmHg.      Pathology:    CLINICAL HISTORY:       Patient: Sharon Shaver is a 38 y.o. female.without significant past medical history kindly referred for evaluation of hypercoagulable state.  She initially presented to the Emergency room on 09/18/2023 with right arm numbness. Her lab work is significant for d-dimer 0.53 and triglycerides. An MRI of the brain demonstrated left frontal acute nonhemorrhagic lacunar infarcts and left cerebellar old lacunar infarct. A CTA head and neck was negative for large vessel occlusion and multiple thyroid nodules seen with 1 hypervascular nodule in the left thyroid. An echo was obtained and demonstrated a positive bubble study.  He was evaluated by cardiologist what she was in the hospital.  Echo was repeated it was essentially normal.  She was placed on 325 mg of aspirin daily and so far tolerating well.     No family h/o of blood clots. She suffers of menstrual related headaches since she was 10 yr old for which she was started on oral contraceptive pills. She was on BCP and stopped the day  of TIA. She denies CP, SOB, palpitations, nausea, vomiting, HA, slurred speech, or vision changes.  Neurological deficit at this time.      Chief Complaint: virtual      Interval History:    Patient is evaluated via telehealth.  She is doing well and voices no concerns.  She will see her primary care physician for the 1st time next Tuesday.  This visit is to discuss hypercoagulable state workup that was done last visit.  All blood were discussed with the patient and workup was negative.      ROS:All 14 points ROS taken and as per Interval History  Review of Systems   Constitutional:  Negative for chills, fever and weight loss.   HENT:  Negative for congestion and nosebleeds.    Eyes:  Negative for blurred vision, double vision and photophobia.   Respiratory:  Negative for cough, hemoptysis and shortness of breath.    Cardiovascular:  Negative for chest pain, palpitations, leg swelling and PND.   Gastrointestinal:  Negative for abdominal pain, blood in stool, constipation, diarrhea, melena, nausea and vomiting.   Genitourinary:  Negative for dysuria, frequency, hematuria and urgency.   Musculoskeletal:  Negative for back pain, falls and myalgias.   Skin:  Negative for itching and rash.   Neurological:  Negative for tremors, focal weakness, seizures, weakness and headaches.   Endo/Heme/Allergies:  Negative for environmental allergies. Does not bruise/bleed easily.   Psychiatric/Behavioral:  Negative for depression and suicidal ideas. The patient is not nervous/anxious.    Answers submitted by the patient for this visit:  Review of Systems Questionnaire (Submitted on 10/23/2023)  appetite change : No  unexpected weight change: No  mouth sores: No  visual disturbance: No  adenopathy: No      Histories:  PMH/PSH/FH/SOCIAL/ALLERGIES AND MEDS REVIEWED AND UPDATED AS APPROPRIATE         Limited physical exam due to virtual visit   There were no vitals filed for this visit.   Physical Exam  Vitals reviewed: LIMITED DUE TO  TELEMEDICINE RESTRICTIONS..   Constitutional:       Appearance: Normal appearance.   HENT:      Head: Normocephalic.   Eyes:      Extraocular Movements: Extraocular movements intact.   Pulmonary:      Effort: Pulmonary effort is normal.   Musculoskeletal:      Cervical back: Neck supple.   Neurological:      Mental Status: She is alert.   Psychiatric:         Mood and Affect: Mood normal.         Behavior: Behavior normal.         Thought Content: Thought content normal.         Judgment: Judgment normal.             Laboratory:  CBC with Differential:  Lab Results   Component Value Date    WBC 6.27 09/20/2023    RBC 4.65 09/20/2023    HGB 13.7 09/20/2023    HCT 40.6 09/20/2023    MCV 87.3 09/20/2023    MCH 29.5 09/20/2023    MCHC 33.7 09/20/2023    RDW 13.0 09/20/2023     09/20/2023    MPV 10.0 09/20/2023       Latest Reference Range & Units 10/11/23 14:16   Cardiolipin IgG Quant <10 GPL U/mL 1.1   Cardiolipin IgM Quant <10 MPL U/mL 5.1   Thrombin Time 0 - 22 seconds 17      Latest Reference Range & Units 10/11/23 14:16   Homocysteine 5.1 - 15.4 umol/L 6.0      Latest Reference Range & Units 10/11/23 14:16   Beta 2 GP1 Ab IgG <15.0 (Negative) SGU <9.4   Beta 2 GP1 Ab IgM <15.0 (Negative) SMU <9.4   DRVV Conf Ratio  1.06   DRVV Norm Ratio 0.00 - 1.19  1.02   DRVV Scr Ratio  1.08     Factor V Leiden (R506Q) Mutation Negative Negative    Lupus anticoagulant not detected.   Protein S Activity, P               91                  %          Latest Reference Range & Units 10/11/23 14:16   Protein C Activity, P 70 - 150 % 119   Protein S Ag, Free, P 50 - 160 % 81   Prothrombin [id number] Mutation Negative  Negative       CMP:  Sodium Level   Date Value Ref Range Status   09/20/2023 139 136 - 145 mmol/L Final     Potassium Level   Date Value Ref Range Status   09/20/2023 5.0 3.5 - 5.1 mmol/L Final     Carbon Dioxide   Date Value Ref Range Status   09/20/2023 23 22 - 29 mmol/L Final     Blood Urea Nitrogen   Date  Value Ref Range Status   09/20/2023 12.1 7.0 - 18.7 mg/dL Final     Creatinine   Date Value Ref Range Status   09/20/2023 0.86 0.55 - 1.02 mg/dL Final     Calcium Level Total   Date Value Ref Range Status   09/20/2023 9.5 8.4 - 10.2 mg/dL Final     Albumin Level   Date Value Ref Range Status   09/20/2023 3.7 3.5 - 5.0 g/dL Final     Bilirubin Total   Date Value Ref Range Status   09/20/2023 0.7 <=1.5 mg/dL Final     Alkaline Phosphatase   Date Value Ref Range Status   09/20/2023 45 40 - 150 unit/L Final     Aspartate Aminotransferase   Date Value Ref Range Status   09/20/2023 18 5 - 34 unit/L Final     Alanine Aminotransferase   Date Value Ref Range Status   09/20/2023 21 0 - 55 unit/L Final     Estimated GFR-Non    Date Value Ref Range Status   11/11/2019 >60 mL/min/1.73 m2 Final             Assessment:       1. Cerebrovascular accident (CVA), unspecified mechanism      1) TIA:   ---Acute Left Frontal Lobe Nonhemorrhagic CVA  ---Left Cerebellar Old Lacunar Infarct        Plan:       Young female recently diagnosed with TIA when she presented with right-sided weakness.  Hypercoagulable state workup is indicated at this time and was negative.     RTC prn    Continue aspirin  No hormone therapy at any point.  Cont followup with Dr Hood and start followup with PCP    The patient was seen, interviewed and examined. Pertinent lab and radiology studies were reviewed.   The patient was given ample opportunity to ask questions, and to the best of my abilities, all questions answered to satisfaction; patient demonstrated understanding of what we discussed and agreeable to the plan. Pt instructed to call should develop concerning signs/symptoms or have further questions.         RADHA SUMMERS MD

## 2023-10-31 ENCOUNTER — OFFICE VISIT (OUTPATIENT)
Dept: PRIMARY CARE CLINIC | Facility: CLINIC | Age: 38
End: 2023-10-31
Payer: COMMERCIAL

## 2023-10-31 VITALS
HEART RATE: 79 BPM | DIASTOLIC BLOOD PRESSURE: 73 MMHG | TEMPERATURE: 98 F | RESPIRATION RATE: 20 BRPM | SYSTOLIC BLOOD PRESSURE: 116 MMHG | BODY MASS INDEX: 29.06 KG/M2 | OXYGEN SATURATION: 98 % | HEIGHT: 64 IN | WEIGHT: 170.19 LBS

## 2023-10-31 DIAGNOSIS — Q21.12 PFO (PATENT FORAMEN OVALE): ICD-10-CM

## 2023-10-31 DIAGNOSIS — E78.1 HYPERTRIGLYCERIDEMIA: ICD-10-CM

## 2023-10-31 DIAGNOSIS — E04.2 MULTINODULAR GOITER: ICD-10-CM

## 2023-10-31 DIAGNOSIS — Z86.73 HISTORY OF CVA (CEREBROVASCULAR ACCIDENT): ICD-10-CM

## 2023-10-31 DIAGNOSIS — Z00.00 ENCOUNTER FOR MEDICAL EXAMINATION TO ESTABLISH CARE: Primary | ICD-10-CM

## 2023-10-31 DIAGNOSIS — G43.829 MENSTRUAL MIGRAINE WITHOUT STATUS MIGRAINOSUS, NOT INTRACTABLE: ICD-10-CM

## 2023-10-31 PROCEDURE — 3078F DIAST BP <80 MM HG: CPT | Mod: CPTII,,, | Performed by: FAMILY MEDICINE

## 2023-10-31 PROCEDURE — 3008F BODY MASS INDEX DOCD: CPT | Mod: CPTII,,, | Performed by: FAMILY MEDICINE

## 2023-10-31 PROCEDURE — 99204 OFFICE O/P NEW MOD 45 MIN: CPT | Mod: ,,, | Performed by: FAMILY MEDICINE

## 2023-10-31 PROCEDURE — 1160F RVW MEDS BY RX/DR IN RCRD: CPT | Mod: CPTII,,, | Performed by: FAMILY MEDICINE

## 2023-10-31 PROCEDURE — 1159F PR MEDICATION LIST DOCUMENTED IN MEDICAL RECORD: ICD-10-PCS | Mod: CPTII,,, | Performed by: FAMILY MEDICINE

## 2023-10-31 PROCEDURE — 3078F PR MOST RECENT DIASTOLIC BLOOD PRESSURE < 80 MM HG: ICD-10-PCS | Mod: CPTII,,, | Performed by: FAMILY MEDICINE

## 2023-10-31 PROCEDURE — 3074F SYST BP LT 130 MM HG: CPT | Mod: CPTII,,, | Performed by: FAMILY MEDICINE

## 2023-10-31 PROCEDURE — 3044F HG A1C LEVEL LT 7.0%: CPT | Mod: CPTII,,, | Performed by: FAMILY MEDICINE

## 2023-10-31 PROCEDURE — 99204 PR OFFICE/OUTPT VISIT, NEW, LEVL IV, 45-59 MIN: ICD-10-PCS | Mod: ,,, | Performed by: FAMILY MEDICINE

## 2023-10-31 PROCEDURE — 3044F PR MOST RECENT HEMOGLOBIN A1C LEVEL <7.0%: ICD-10-PCS | Mod: CPTII,,, | Performed by: FAMILY MEDICINE

## 2023-10-31 PROCEDURE — 1160F PR REVIEW ALL MEDS BY PRESCRIBER/CLIN PHARMACIST DOCUMENTED: ICD-10-PCS | Mod: CPTII,,, | Performed by: FAMILY MEDICINE

## 2023-10-31 PROCEDURE — 3008F PR BODY MASS INDEX (BMI) DOCUMENTED: ICD-10-PCS | Mod: CPTII,,, | Performed by: FAMILY MEDICINE

## 2023-10-31 PROCEDURE — 3074F PR MOST RECENT SYSTOLIC BLOOD PRESSURE < 130 MM HG: ICD-10-PCS | Mod: CPTII,,, | Performed by: FAMILY MEDICINE

## 2023-10-31 PROCEDURE — 1159F MED LIST DOCD IN RCRD: CPT | Mod: CPTII,,, | Performed by: FAMILY MEDICINE

## 2023-10-31 NOTE — PROGRESS NOTES
Sharon Shaver  2023  [id number]    Subjective:      Patient ID: Sharon Shaver is a 38 y.o. female.    Chief Complaint: Establish Care (Had stroke on 23 and thyroid findings around the same time. Having numbness in fingers on right hand since stroke. )    Disclaimer:  This note is prepared using voice recognition software and as such is likely to have errors despite attempts at proofreading. Please contact me for questions.     38-year-old female who presents to [facility].  The patient has a history of recent lacunar CVA in 2023.  During hospitalization she was found to have PFO and was previously taking OCPs.  She admits to residual 4th and 5th digit numbness and tingling as well as mild weakness with prolonged use.  She also admits to fatigue and occasional word-finding that resolves within minutes.  She is being followed by Cardiology (Dr. Hood) and Hematology (Dr. Rankin).  The patient states that she has not been established with a neurologist.  The patient states that she was diagnosed with a multinodular goiter however previous thyroid function tests have been normal.  She is a nonsmoker and denies any recreational drug use.  Patient has a significant family history of heart disease and hypertension.  She has also discontinued OCPs.    History:  Past Medical History:   Diagnosis Date    History of CVA (cerebrovascular accident) 10/31/2023    Multinodular goiter 10/31/2023    PFO (patent foramen ovale) 2023     Past Surgical History:   Procedure Laterality Date     SECTION       SECTION      SINUS SURGERY  1992    TONSILLECTOMY, ADENOIDECTOMY       Family History   Problem Relation Age of Onset    Hypertension Mother     COPD Mother     Atrial fibrillation Mother     Stroke Mother     Pacemaker/defibrilator Father     Heart failure Maternal Grandmother     Hypertension Paternal Grandmother      Social History     Socioeconomic History    Marital status:     Tobacco Use    Smoking status: Never    Smokeless tobacco: Never   Substance and Sexual Activity    Alcohol use: Never    Drug use: Never    Sexual activity: Yes     Partners: Male     Social Determinants of Health     Financial Resource Strain: Low Risk  (10/31/2023)    Overall Financial Resource Strain (CARDIA)     Difficulty of Paying Living Expenses: Not hard at all   Food Insecurity: No Food Insecurity (10/31/2023)    Hunger Vital Sign     Worried About Running Out of Food in the Last Year: Never true     Ran Out of Food in the Last Year: Never true   Transportation Needs: No Transportation Needs (10/31/2023)    PRAPARE - Transportation     Lack of Transportation (Medical): No     Lack of Transportation (Non-Medical): No   Physical Activity: Sufficiently Active (10/31/2023)    Exercise Vital Sign     Days of Exercise per Week: 7 days     Minutes of Exercise per Session: 60 min   Stress: No Stress Concern Present (10/31/2023)    [employer] - Occupational Stress Questionnaire     Feeling of Stress : Not at all   Social Connections: Moderately Isolated (10/31/2023)    Social Connection and Isolation Panel [NHANES]     Frequency of Communication with Friends and Family: More than three times a week     Frequency of Social Gatherings with Friends and Family: More than three times a week     Attends Orthodox Services: Never     Active Member of Clubs or Organizations: No     Attends Club or Organization Meetings: Never     Marital Status:    Housing Stability: Low Risk  (10/31/2023)    Housing Stability Vital Sign     Unable to Pay for Housing in the Last Year: No     Number of Places Lived in the Last Year: 1     Unstable Housing in the Last Year: No     Patient Active Problem List   Diagnosis    Stroke    PFO (patent foramen ovale)    History of CVA (cerebrovascular accident)    Multinodular goiter    Menstrual migraine     Review of patient's allergies indicates:    Allergen Reactions    Sulfa (sulfonamide antibiotics) Hives       Medications:  Current Outpatient Medications on File Prior to Visit   Medication Sig Dispense Refill    aspirin (ECOTRIN) 325 MG EC tablet Take 1 tablet (325 mg total) by mouth once daily. 30 tablet 3     No current facility-administered medications on file prior to visit.       Lab Results   Component Value Date    WBC 6.27 09/20/2023    HGB 13.7 09/20/2023    HCT 40.6 09/20/2023     09/20/2023    CHOL 160 09/18/2023    TRIG 148 (H) 09/18/2023    HDL 46 09/18/2023    ALT 21 09/20/2023    AST 18 09/20/2023     09/20/2023    K 5.0 09/20/2023    CREATININE 0.86 09/20/2023    BUN 12.1 09/20/2023    CO2 23 09/20/2023    TSH 2.544 09/18/2023    INR 1.0 09/19/2023    HGBA1C 5.2 09/19/2023    CALCIUM 9.5 09/20/2023       Transesophageal echo (YA)  Procedure: YA     Final impression:    LV systolic function 55-60%.  No intracardiac thrombus is present.  Large PFO noted with several bubble crossing on first beat  No significant valvular abnormalities.     Recommendations:    PFO closure for recurrent stroke.     Indication: Recurrent CVA    Informed consent: obtained, signed copy placed in the chart.    Description of the procedure: After sedation was achieved (please see   anesthesia report for details), the esophagus intubated without   difficulties. Multiple orthogonal views obtained. Patient tolerated   procedure without immediate complications noted.    Findings:  Left atrium (LA): Normal LA size.  Left atrial appendage (MARKY): No MARKY thrombus is present.  Interatrial septum:  + ASD or PFO noted on 2D or Color Doppler images.   Bubble study performed was positive for large PFO noted with several   bubble crossing on first beat  Right atrium (RA): Normal RA size.  Left ventricle (LV): LVEF 55-60%.   Normal LV size.  Normal LV wall   thickness. No Regional wall motion abnormalities noted.  Right ventricle (RV): Partially visualized in this  "study.  Aortic valve: Trileaflet.  No Aortic stenosis.   No aortic regurgitation.   No Vegetation is present.  Mitral valve: No mitral annular calcification is present.  No Mitral   stenosis.  No Mitral valve prolapse. Trace Mitral regurgitation. No   Vegetation is present.  Tricuspid valve: Partially visualized, No Tricuspid stenosis. Trace   Tricuspid regurgitation. No Vegetation is present.  Pulmonic valve: Not well visualized, no hemodynamically significant   pulmonic stenosis, no pulmonic regurgitation noted in this study. No   Vegetation is present.  Thoracic aorta: No Atherosclerotic changes of the descending thoracic   aorta.  Normal Caliber aortic root.   Normal Caliber Proximal portion of   the ascending aorta.   Pericardium: No significant pericardial effusion is present.            Review of Systems   Constitutional:  Negative for chills, diaphoresis and fever.   Eyes:  Negative for blurred vision and double vision.   Respiratory:  Negative for cough and shortness of breath.    Cardiovascular:  Negative for chest pain and leg swelling.   Gastrointestinal:  Negative for abdominal pain, diarrhea, nausea and vomiting.   Skin:  Negative for rash.   Neurological:  Positive for tingling and sensory change. Negative for dizziness, tremors and headaches.       Objective:     Vitals:    10/31/23 1434   BP: 116/73   Pulse: 79   Resp: 20   Temp: 97.9 °F (36.6 °C)   SpO2: 98%   Weight: 77.2 kg (170 lb 3.2 oz)   Height: 5' 4" (1.626 m)     Physical Exam  Vitals reviewed.   Constitutional:       General: She is not in acute distress.     Appearance: Normal appearance. She is not ill-appearing, toxic-appearing or diaphoretic.   HENT:      Head: Normocephalic.   Eyes:      General:         Right eye: No discharge.         Left eye: No discharge.      Extraocular Movements: Extraocular movements intact.      Conjunctiva/sclera: Conjunctivae normal.   Cardiovascular:      Rate and Rhythm: Normal rate and regular rhythm. "      Pulses: Normal pulses.      Heart sounds: Murmur heard.      Systolic murmur is present with a grade of 1/6.      No friction rub. No gallop.   Pulmonary:      Effort: Pulmonary effort is normal. No respiratory distress.      Breath sounds: Normal breath sounds. No stridor. No wheezing, rhonchi or rales.   Musculoskeletal:         General: Normal range of motion.      Cervical back: Normal range of motion and neck supple. No rigidity.   Skin:     General: Skin is warm and dry.      Coloration: Skin is not pale.   Neurological:      General: No focal deficit present.      Mental Status: She is alert and oriented to person, place, and time. Mental status is at baseline.   Psychiatric:         Mood and Affect: Mood normal.         Behavior: Behavior normal.         Thought Content: Thought content normal.         Judgment: Judgment normal.         Assessment:     1. Encounter for medical examination to establish care    2. PFO (patent foramen ovale)    3. History of CVA (cerebrovascular accident)    4. Multinodular goiter    5. Menstrual migraine without status migrainosus, not intractable    6. Hypertriglyceridemia      Plan:   Sharon was seen today for establish care.    Diagnoses and all orders for this visit:    1. Encounter for medical examination to establish care  Recommend annual eye exam and biannual dental exams.  Limit caffeine and alcohol intake.  Well balanced diet low in sugar/complex carbohydrates and increased vegetable intake encouraged.    2. PFO (patent foramen ovale)  Patient being evaluated for surgical intervention of PFO.  Continue  mg PO daily.  Strict BP control encouraged. BP today 116/73.  Avoid tobacco use.  Patient has already D/C OCPs.  Strict ED precautions discussed. Patient verbalized understanding.  Keep follow up with cardiology.    3. History of CVA (cerebrovascular accident)  -     Ambulatory referral/consult to Neurology; Future  See #2    4. Multinodular goiter  US  Thyroid results above.  TSH: 2.544 (09/18/2023).  Repeat US in 1 year.  Repeat TSH and Free T4 in 6 months.    5. Menstrual migraine without status migrainosus, not intractable  Stable  Patient denies recent migraine since CVA.  Strict ED precautions discussed and patient verbalized understanding.    6. Hypertriglyceridemia  Triglycerides 148, goal <140.  Denies chest pain, SOB, headaches, dizziness, or muscle cramps.  Consider Flax Seed/Fish Oil supplements. Increase dietary fiber intake.  Recommend low fat, low cholesterol diet and exercise.  Keep follow up with cardiology.      Follow up: Follow up in about 4 weeks (around 11/28/2023) for Wellness Visit.  After visit summary was printed and given to patient upon discharge today.    Sharon Shaver was given education on their disease process and medications.

## 2023-11-06 ENCOUNTER — OFFICE VISIT (OUTPATIENT)
Dept: NEUROLOGY | Facility: CLINIC | Age: 38
End: 2023-11-06
Payer: COMMERCIAL

## 2023-11-06 VITALS
DIASTOLIC BLOOD PRESSURE: 70 MMHG | HEIGHT: 64 IN | WEIGHT: 170 LBS | BODY MASS INDEX: 29.02 KG/M2 | SYSTOLIC BLOOD PRESSURE: 108 MMHG

## 2023-11-06 DIAGNOSIS — Z86.73 HISTORY OF CVA (CEREBROVASCULAR ACCIDENT): ICD-10-CM

## 2023-11-06 PROCEDURE — 3008F PR BODY MASS INDEX (BMI) DOCUMENTED: ICD-10-PCS | Mod: CPTII,S$GLB,, | Performed by: PSYCHIATRY & NEUROLOGY

## 2023-11-06 PROCEDURE — 99999 PR PBB SHADOW E&M-EST. PATIENT-LVL III: CPT | Mod: PBBFAC,,, | Performed by: PSYCHIATRY & NEUROLOGY

## 2023-11-06 PROCEDURE — 99214 PR OFFICE/OUTPT VISIT, EST, LEVL IV, 30-39 MIN: ICD-10-PCS | Mod: S$GLB,,, | Performed by: PSYCHIATRY & NEUROLOGY

## 2023-11-06 PROCEDURE — 3044F PR MOST RECENT HEMOGLOBIN A1C LEVEL <7.0%: ICD-10-PCS | Mod: CPTII,S$GLB,, | Performed by: PSYCHIATRY & NEUROLOGY

## 2023-11-06 PROCEDURE — 1159F MED LIST DOCD IN RCRD: CPT | Mod: CPTII,S$GLB,, | Performed by: PSYCHIATRY & NEUROLOGY

## 2023-11-06 PROCEDURE — 3044F HG A1C LEVEL LT 7.0%: CPT | Mod: CPTII,S$GLB,, | Performed by: PSYCHIATRY & NEUROLOGY

## 2023-11-06 PROCEDURE — 3074F SYST BP LT 130 MM HG: CPT | Mod: CPTII,S$GLB,, | Performed by: PSYCHIATRY & NEUROLOGY

## 2023-11-06 PROCEDURE — 1159F PR MEDICATION LIST DOCUMENTED IN MEDICAL RECORD: ICD-10-PCS | Mod: CPTII,S$GLB,, | Performed by: PSYCHIATRY & NEUROLOGY

## 2023-11-06 PROCEDURE — 3078F DIAST BP <80 MM HG: CPT | Mod: CPTII,S$GLB,, | Performed by: PSYCHIATRY & NEUROLOGY

## 2023-11-06 PROCEDURE — 99214 OFFICE O/P EST MOD 30 MIN: CPT | Mod: S$GLB,,, | Performed by: PSYCHIATRY & NEUROLOGY

## 2023-11-06 PROCEDURE — 99999 PR PBB SHADOW E&M-EST. PATIENT-LVL III: ICD-10-PCS | Mod: PBBFAC,,, | Performed by: PSYCHIATRY & NEUROLOGY

## 2023-11-06 PROCEDURE — 3078F PR MOST RECENT DIASTOLIC BLOOD PRESSURE < 80 MM HG: ICD-10-PCS | Mod: CPTII,S$GLB,, | Performed by: PSYCHIATRY & NEUROLOGY

## 2023-11-06 PROCEDURE — 3074F PR MOST RECENT SYSTOLIC BLOOD PRESSURE < 130 MM HG: ICD-10-PCS | Mod: CPTII,S$GLB,, | Performed by: PSYCHIATRY & NEUROLOGY

## 2023-11-06 PROCEDURE — 3008F BODY MASS INDEX DOCD: CPT | Mod: CPTII,S$GLB,, | Performed by: PSYCHIATRY & NEUROLOGY

## 2023-11-06 NOTE — PROGRESS NOTES
Neurology Office Visit  Neurology    Sharon Shaver is a 38 y.o. female for stroke.  Admitted 2023 for acute RUE numbness.  MRI revealed acute infarct.  Echo showed large PFO.  Atypical stroke profile WNL.  Cardiac monitor WNL thus far.  PFO closure planned.  No further stroke events.    ROS:  Review of Systems   All other systems reviewed and are negative.     Past Medical History:   Diagnosis Date    History of CVA (cerebrovascular accident) 10/31/2023    Multinodular goiter 10/31/2023    PFO (patent foramen ovale) 2023     Past Surgical History:   Procedure Laterality Date     SECTION       SECTION      SINUS SURGERY  1992    TONSILLECTOMY, ADENOIDECTOMY       Family History   Problem Relation Age of Onset    Hypertension Mother     COPD Mother     Atrial fibrillation Mother     Stroke Mother     Pacemaker/defibrilator Father     Heart failure Maternal Grandmother     Hypertension Paternal Grandmother      Review of patient's allergies indicates:   Allergen Reactions    Sulfa (sulfonamide antibiotics) Hives       Current Outpatient Medications:     aspirin (ECOTRIN) 325 MG EC tablet, Take 1 tablet (325 mg total) by mouth once daily., Disp: 30 tablet, Rfl: 3  Outpatient Medications Marked as Taking for the 23 encounter (Office Visit) with Nas Wise MD   Medication Sig Dispense Refill    aspirin (ECOTRIN) 325 MG EC tablet Take 1 tablet (325 mg total) by mouth once daily. 30 tablet 3     Social History     Tobacco Use    Smoking status: Never    Smokeless tobacco: Never   Substance Use Topics    Alcohol use: Never    Drug use: Never         Vitals:    23 0949   BP: 108/70     Gen NAD  HEENT NC/AT  CV RRR, no carotid bruits  Resp clear  GI soft  Ext no C/C/E  Neuro  AAOx4  Speech fluent, appropriate  EOMI, PERRLA, VFF, no papilledema  Normal facial strength, sensation  Tongue and palate midline  Motor 5/5  Sensation decreased RUE  DTRs symmetric  Coord intact  Gait  Normal    Assessment: Stroke  PFO    Plan: Agree with PFO closure

## 2023-11-14 ENCOUNTER — PATIENT MESSAGE (OUTPATIENT)
Dept: CARDIOLOGY | Facility: CLINIC | Age: 38
End: 2023-11-14
Payer: COMMERCIAL

## 2023-12-12 ENCOUNTER — OFFICE VISIT (OUTPATIENT)
Dept: URGENT CARE | Facility: CLINIC | Age: 38
End: 2023-12-12
Payer: COMMERCIAL

## 2023-12-12 VITALS
HEART RATE: 91 BPM | HEIGHT: 64 IN | OXYGEN SATURATION: 98 % | WEIGHT: 170 LBS | RESPIRATION RATE: 18 BRPM | TEMPERATURE: 98 F | DIASTOLIC BLOOD PRESSURE: 82 MMHG | BODY MASS INDEX: 29.02 KG/M2 | SYSTOLIC BLOOD PRESSURE: 119 MMHG

## 2023-12-12 DIAGNOSIS — J10.1 INFLUENZA A: Primary | ICD-10-CM

## 2023-12-12 DIAGNOSIS — R05.9 COUGH, UNSPECIFIED TYPE: ICD-10-CM

## 2023-12-12 DIAGNOSIS — J02.9 SORE THROAT: ICD-10-CM

## 2023-12-12 LAB
CTP QC/QA: YES
CTP QC/QA: YES
MOLECULAR STREP A: NEGATIVE
POC MOLECULAR INFLUENZA A AGN: POSITIVE
POC MOLECULAR INFLUENZA B AGN: NEGATIVE

## 2023-12-12 PROCEDURE — 87502 INFLUENZA DNA AMP PROBE: CPT | Mod: QW,,,

## 2023-12-12 PROCEDURE — 87651 POCT STREP A MOLECULAR: ICD-10-PCS | Mod: QW,,,

## 2023-12-12 PROCEDURE — 87502 POCT INFLUENZA A/B MOLECULAR: ICD-10-PCS | Mod: QW,,,

## 2023-12-12 PROCEDURE — 99213 PR OFFICE/OUTPT VISIT, EST, LEVL III, 20-29 MIN: ICD-10-PCS | Mod: ,,,

## 2023-12-12 PROCEDURE — 87651 STREP A DNA AMP PROBE: CPT | Mod: QW,,,

## 2023-12-12 PROCEDURE — 99213 OFFICE O/P EST LOW 20 MIN: CPT | Mod: ,,,

## 2023-12-12 RX ORDER — BALOXAVIR MARBOXIL 40 MG/1
40 TABLET, FILM COATED ORAL ONCE
Qty: 1 TABLET | Refills: 0 | Status: SHIPPED | OUTPATIENT
Start: 2023-12-12 | End: 2023-12-12

## 2023-12-12 RX ORDER — OSELTAMIVIR PHOSPHATE 75 MG/1
75 CAPSULE ORAL 2 TIMES DAILY
Qty: 10 CAPSULE | Refills: 0 | Status: SHIPPED | OUTPATIENT
Start: 2023-12-12 | End: 2023-12-17

## 2023-12-12 RX ORDER — PROMETHAZINE HYDROCHLORIDE AND DEXTROMETHORPHAN HYDROBROMIDE 6.25; 15 MG/5ML; MG/5ML
5 SYRUP ORAL EVERY 6 HOURS PRN
Qty: 240 ML | Refills: 0 | Status: SHIPPED | OUTPATIENT
Start: 2023-12-12 | End: 2023-12-12 | Stop reason: SDUPTHER

## 2023-12-12 RX ORDER — PROMETHAZINE HYDROCHLORIDE AND DEXTROMETHORPHAN HYDROBROMIDE 6.25; 15 MG/5ML; MG/5ML
5 SYRUP ORAL EVERY 6 HOURS PRN
Qty: 240 ML | Refills: 0 | Status: SHIPPED | OUTPATIENT
Start: 2023-12-12 | End: 2023-12-22

## 2023-12-12 RX ORDER — BALOXAVIR MARBOXIL 40 MG/1
40 TABLET, FILM COATED ORAL ONCE
Qty: 1 TABLET | Refills: 0 | Status: SHIPPED | OUTPATIENT
Start: 2023-12-12 | End: 2023-12-12 | Stop reason: SDUPTHER

## 2023-12-12 NOTE — PATIENT INSTRUCTIONS
Drink plenty of fluids.     Tylenol every 4 hours, Motrin every 6 as needed for fever.    Take OTC cough/cold/congestion medication such as Dayquil/Nyquil.  May also take antihistamine such as Claritin/Zyrtec/Allegra.  Cepacol sore throat lozenges if needed.     Patient is contagious for 5 days from symptom onset.     Go to ER with any shortness of breath or other worrisome symptoms.     Pt. Reports right sided flank pain that started 3 days ago, + nausea.

## 2023-12-12 NOTE — PROGRESS NOTES
"Subjective:      Patient ID: Sharon Shaver is a 38 y.o. female.    Vitals:  height is 5' 4" (1.626 m) and weight is 77.1 kg (170 lb). Her temperature is 98.4 °F (36.9 °C). Her blood pressure is 119/82 and her pulse is 91. Her respiration is 18 and oxygen saturation is 98%.     Chief Complaint: Sore Throat (Pt presents to clinic with sore throat, cough , and tightness in her chest when coughing. Symptomatic x 2 days. Pt was exposed to strep.)    Patient is a 38-year-old female that presents complaining of sore throat, cough, chest congestion, ear pain x2 days with strep exposure. Patient denies any SOB, CP, rash, n/v/d, or neck stiffness.      Sore Throat   Associated symptoms include congestion and coughing.       HENT:  Positive for congestion and sore throat.    Respiratory:  Positive for cough.       Objective:     Physical Exam   Constitutional: She is oriented to person, place, and time.  Non-toxic appearance. She does not appear ill.   HENT:   Ears:   Right Ear: Tympanic membrane, external ear and ear canal normal.   Left Ear: Tympanic membrane, external ear and ear canal normal.   Nose: Congestion present.   Mouth/Throat: Mucous membranes are moist. Posterior oropharyngeal erythema present. No oropharyngeal exudate. Oropharynx is clear.   Eyes: Conjunctivae are normal.   Cardiovascular: Normal rate and normal pulses.   Pulmonary/Chest: Effort normal and breath sounds normal.   Abdominal: Normal appearance and bowel sounds are normal. Soft. There is no abdominal tenderness.   Musculoskeletal: Normal range of motion.         General: Normal range of motion.   Neurological: She is alert and oriented to person, place, and time.   Skin: Skin is warm, dry and no rash. Capillary refill takes less than 2 seconds.   Psychiatric: Her behavior is normal. Mood normal.       Assessment:     1. Influenza A    2. Sore throat    3. Cough, unspecified type           Previous History      Review of patient's allergies indicates: " "  Allergen Reactions    Sulfa (sulfonamide antibiotics) Hives       Past Medical History:   Diagnosis Date    History of CVA (cerebrovascular accident) 10/31/2023    Multinodular goiter 10/31/2023    PFO (patent foramen ovale) 2023     Current Outpatient Medications   Medication Instructions    aspirin (ECOTRIN) 325 mg, Oral, Daily    promethazine-dextromethorphan (PROMETHAZINE-DM) 6.25-15 mg/5 mL Syrp 5 mLs, Oral, Every 6 hours PRN    XOFLUZA 40 mg, Oral, Once     Past Surgical History:   Procedure Laterality Date     SECTION       SECTION      SINUS SURGERY  1992    TONSILLECTOMY, ADENOIDECTOMY       Family History   Problem Relation Age of Onset    Hypertension Mother     COPD Mother     Atrial fibrillation Mother     Stroke Mother     Pacemaker/defibrilator Father     Heart failure Maternal Grandmother     Hypertension Paternal Grandmother        Social History     Tobacco Use    Smoking status: Never    Smokeless tobacco: Never   Substance Use Topics    Alcohol use: Never    Drug use: Never        Physical Exam      Vital Signs Reviewed   /82   Pulse 91   Temp 98.4 °F (36.9 °C)   Resp 18   Ht 5' 4" (1.626 m)   Wt 77.1 kg (170 lb)   LMP 2023   SpO2 98%   BMI 29.18 kg/m²        Procedures    Procedures     Labs     Results for orders placed or performed in visit on 23   POCT Influenza A/B Molecular   Result Value Ref Range    POC Molecular Influenza A Ag Positive (A) Negative, Not Reported    POC Molecular Influenza B Ag Negative Negative, Not Reported     Acceptable Yes    POCT Strep A, Molecular   Result Value Ref Range    Molecular Strep A, POC Negative Negative     Acceptable Yes       Plan:     Patient prescribed Xofluza    Tamiflu script only be filled if patient can not find medication or if it was too expensive.    Influenza A  -     baloxavir marboxiL (XOFLUZA) 40 mg tablet; Take 1 tablet (40 mg total) by mouth once. " for 1 dose  Dispense: 1 tablet; Refill: 0    Sore throat  -     POCT Influenza A/B Molecular  -     POCT Strep A, Molecular    Cough, unspecified type  -     promethazine-dextromethorphan (PROMETHAZINE-DM) 6.25-15 mg/5 mL Syrp; Take 5 mLs by mouth every 6 (six) hours as needed (Cough).  Dispense: 240 mL; Refill: 0    Drink plenty of fluids.     Tylenol every 4 hours, Motrin every 6 as needed for fever.    Take OTC cough/cold/congestion medication such as Dayquil/Nyquil.  May also take antihistamine such as Claritin/Zyrtec/Allegra.  Cepacol sore throat lozenges if needed.     Patient is contagious for 5 days from symptom onset.     Go to ER with any shortness of breath or other worrisome symptoms.

## 2023-12-25 PROBLEM — I63.9 STROKE: Status: RESOLVED | Noted: 2023-09-18 | Resolved: 2023-12-25

## 2024-01-11 ENCOUNTER — OFFICE VISIT (OUTPATIENT)
Dept: PRIMARY CARE CLINIC | Facility: CLINIC | Age: 39
End: 2024-01-11
Payer: COMMERCIAL

## 2024-01-11 VITALS
RESPIRATION RATE: 20 BRPM | HEART RATE: 85 BPM | OXYGEN SATURATION: 98 % | DIASTOLIC BLOOD PRESSURE: 78 MMHG | BODY MASS INDEX: 29.02 KG/M2 | SYSTOLIC BLOOD PRESSURE: 117 MMHG | TEMPERATURE: 98 F | WEIGHT: 170 LBS | HEIGHT: 64 IN

## 2024-01-11 DIAGNOSIS — Q21.12 PFO (PATENT FORAMEN OVALE): ICD-10-CM

## 2024-01-11 DIAGNOSIS — D68.59 HYPERCOAGULABLE STATE: ICD-10-CM

## 2024-01-11 DIAGNOSIS — E04.2 MULTINODULAR GOITER: ICD-10-CM

## 2024-01-11 DIAGNOSIS — Z11.59 ENCOUNTER FOR HEPATITIS C SCREENING TEST FOR LOW RISK PATIENT: ICD-10-CM

## 2024-01-11 DIAGNOSIS — E78.1 HYPERTRIGLYCERIDEMIA: ICD-10-CM

## 2024-01-11 DIAGNOSIS — Z00.00 ENCOUNTER FOR PREVENTATIVE ADULT HEALTH CARE EXAMINATION: Primary | ICD-10-CM

## 2024-01-11 DIAGNOSIS — Z86.73 HISTORY OF CVA (CEREBROVASCULAR ACCIDENT): ICD-10-CM

## 2024-01-11 PROBLEM — G43.829 MENSTRUAL MIGRAINE: Status: RESOLVED | Noted: 2023-10-31 | Resolved: 2024-01-11

## 2024-01-11 PROCEDURE — 3008F BODY MASS INDEX DOCD: CPT | Mod: CPTII,,, | Performed by: FAMILY MEDICINE

## 2024-01-11 PROCEDURE — 99395 PREV VISIT EST AGE 18-39: CPT | Mod: ,,, | Performed by: FAMILY MEDICINE

## 2024-01-11 PROCEDURE — 1160F RVW MEDS BY RX/DR IN RCRD: CPT | Mod: CPTII,,, | Performed by: FAMILY MEDICINE

## 2024-01-11 PROCEDURE — 3074F SYST BP LT 130 MM HG: CPT | Mod: CPTII,,, | Performed by: FAMILY MEDICINE

## 2024-01-11 PROCEDURE — 3078F DIAST BP <80 MM HG: CPT | Mod: CPTII,,, | Performed by: FAMILY MEDICINE

## 2024-01-11 PROCEDURE — 1159F MED LIST DOCD IN RCRD: CPT | Mod: CPTII,,, | Performed by: FAMILY MEDICINE

## 2024-01-11 RX ORDER — CLOPIDOGREL BISULFATE 75 MG/1
75 TABLET ORAL ONCE
Status: ON HOLD | COMMUNITY
Start: 2024-01-03 | End: 2024-01-23 | Stop reason: HOSPADM

## 2024-01-11 RX ORDER — ASPIRIN 81 MG/1
81 TABLET ORAL DAILY
Status: ON HOLD | COMMUNITY
End: 2024-01-23

## 2024-01-11 NOTE — PROGRESS NOTES
Patient ID: [id number]     Chief Complaint: Annual Exam (No concerns. Having pfo on )        HPI:   Disclaimer:  This note is prepared using voice recognition software and as such is likely to have errors despite attempts at proofreading. Please contact me for questions.     Sharon Shaver is a 38 y.o. female here today for an annual wellness visit. No other complaints today.   The patient admits to a mostly healthy diet and states that she frequently cooks at home for meals.  She admits to eating vegetables and fruit.  She denies routine exercise and her caffeine intake consists of 1 cup of coffee in the morning.  She denies drinking energy drinks in rarely drinks soft drinks.  PFO:  Patient is scheduled for surgery this month to repair patent foraminal valve due to history of CVA.  She admits to compliance with aspirin and Plavix.  Denies recent chest pain, shortness of breath, headache or blurred vision.    Multinodular goiter:  Patient has history of multinodular goiter. Last thyroid ultrasound 2023 and recommendations for 1 year follow-up.  Last TSH was 2.544 in 2023.      Cervical Cancer Screening - Last Pap in  with Dr. Lomeli. Patient has visit scheduled for 1 week with new doctor (Dr. Rivera).  Breast Cancer Screening - Due at age 40.  Colon Cancer Screening - Due at age 45.  Osteoporosis Screening - Not yet due.  Eye Exam - Last eye exam 1 year ago, wears contacts .  Dental Exam - Last dental exam within the last 6 months   Vaccinations -   Immunization History   Administered Date(s) Administered    Tdap 2015        Past Medical History:   Diagnosis Date    History of CVA (cerebrovascular accident) 10/31/2023    Multinodular goiter 10/31/2023    PFO (patent foramen ovale) 2023        Past Surgical History:   Procedure Laterality Date     SECTION       SECTION      SINUS SURGERY  1992    TONSILLECTOMY, ADENOIDECTOMY         Review of patient's allergies  indicates:   Allergen Reactions    Sulfa (sulfonamide antibiotics) Hives       Outpatient Medications Marked as Taking for the 1/11/24 encounter (Office Visit) with Ema Castañeda MD   Medication Sig Dispense Refill    aspirin (ECOTRIN) 81 MG EC tablet Take 81 mg by mouth once daily.      clopidogreL (PLAVIX) 75 mg tablet Take 75 mg by mouth once.         Social History     Socioeconomic History    Marital status:    Tobacco Use    Smoking status: Never    Smokeless tobacco: Never   Substance and Sexual Activity    Alcohol use: Never    Drug use: Never    Sexual activity: Yes     Partners: Male     Social Determinants of Health     Financial Resource Strain: Low Risk  (10/31/2023)    Overall Financial Resource Strain (CARDIA)     Difficulty of Paying Living Expenses: Not hard at all   Food Insecurity: No Food Insecurity (10/31/2023)    Hunger Vital Sign     Worried About Running Out of Food in the Last Year: Never true     Ran Out of Food in the Last Year: Never true   Transportation Needs: No Transportation Needs (10/31/2023)    PRAPARE - Transportation     Lack of Transportation (Medical): No     Lack of Transportation (Non-Medical): No   Physical Activity: Sufficiently Active (10/31/2023)    Exercise Vital Sign     Days of Exercise per Week: 7 days     Minutes of Exercise per Session: 60 min   Stress: No Stress Concern Present (10/31/2023)    [place] [employer] - Occupational Stress Questionnaire     Feeling of Stress : Not at all   Social Connections: Moderately Isolated (10/31/2023)    Social Connection and Isolation Panel [NHANES]     Frequency of Communication with Friends and Family: More than three times a week     Frequency of Social Gatherings with Friends and Family: More than three times a week     Attends Catholic Services: Never     Active Member of Clubs or Organizations: No     Attends Club or Organization Meetings: Never     Marital Status:    Housing  "Stability: Low Risk  (10/31/2023)    Housing Stability Vital Sign     Unable to Pay for Housing in the Last Year: No     Number of Places Lived in the Last Year: 1     Unstable Housing in the Last Year: No        Family History   Problem Relation Age of Onset    Hypertension Mother     COPD Mother     Atrial fibrillation Mother     Stroke Mother     Pacemaker/defibrilator Father     Heart failure Maternal Grandmother     Hypertension Paternal Grandmother         Lab Results   Component Value Date    WBC 6.27 09/20/2023    HGB 13.7 09/20/2023    HCT 40.6 09/20/2023     09/20/2023    CHOL 160 09/18/2023    TRIG 148 (H) 09/18/2023    HDL 46 09/18/2023    ALT 21 09/20/2023    AST 18 09/20/2023     09/20/2023    K 5.0 09/20/2023    CREATININE 0.86 09/20/2023    BUN 12.1 09/20/2023    CO2 23 09/20/2023    TSH 2.544 09/18/2023    INR 1.0 09/19/2023    HGBA1C 5.2 09/19/2023     Subjective:     Review of Systems:   Review of Systems   Constitutional:  Negative for chills, diaphoresis and fever.   Eyes:  Negative for blurred vision and double vision.   Respiratory:  Negative for cough and shortness of breath.    Cardiovascular:  Negative for chest pain and leg swelling.   Gastrointestinal:  Negative for abdominal pain, diarrhea, nausea and vomiting.   Skin:  Negative for rash.   Neurological:  Negative for dizziness, tremors and headaches.        See HPI for details    Objective:     /78   Pulse 85   Temp 97.8 °F (36.6 °C)   Resp 20   Ht 5' 4" (1.626 m)   Wt 77.1 kg (170 lb)   LMP 12/06/2023   SpO2 98%   BMI 29.18 kg/m²     Physical Exam  Constitutional:       General: She is not in acute distress.     Appearance: She is not toxic-appearing or diaphoretic.   HENT:      Head: Normocephalic and atraumatic.      Right Ear: Tympanic membrane, ear canal and external ear normal. There is no impacted cerumen.      Left Ear: Tympanic membrane, ear canal and external ear normal. There is no impacted cerumen. "      Nose: Nose normal.      Mouth/Throat:      Mouth: Mucous membranes are moist.      Pharynx: Oropharynx is clear. No oropharyngeal exudate or posterior oropharyngeal erythema.   Eyes:      General: No scleral icterus.     Extraocular Movements: Extraocular movements intact.      Conjunctiva/sclera: Conjunctivae normal.   Cardiovascular:      Rate and Rhythm: Normal rate and regular rhythm.      Heart sounds: Normal heart sounds. No murmur heard.     No friction rub. No gallop.   Pulmonary:      Effort: Pulmonary effort is normal. No respiratory distress.      Breath sounds: Normal breath sounds. No stridor. No wheezing, rhonchi or rales.   Musculoskeletal:         General: Normal range of motion.      Cervical back: Normal range of motion and neck supple. No rigidity.   Lymphadenopathy:      Cervical: No cervical adenopathy.   Skin:     General: Skin is warm and dry.      Coloration: Skin is not pale.   Neurological:      General: No focal deficit present.      Mental Status: She is alert and oriented to person, place, and time. Mental status is at baseline.   Psychiatric:         Mood and Affect: Mood normal.         Behavior: Behavior normal.         Thought Content: Thought content normal.         Judgment: Judgment normal.       Reading Physician Reading Date Result Priority   Jaimie Larson MD  [phone] 9/19/2023 Routine     Narrative & Impression  EXAMINATION:  US THYROID     CLINICAL HISTORY:  thyroid nodule seen on CTA;.     TECHNIQUE:  Ultrasound of the thyroid and cervical lymph nodes was performed.     COMPARISON:  CTA 09/18/2023     FINDINGS:  SIZE:  Right lobe: 5.4 x 2.1 x 1.9 cm  Left lobe: 4.7 x 1.9 x 1.7 cm  Isthmus: 0.3 cm  PARENCHYMA:  Mildly heterogeneous parenchyma.  NODULES:  Hypoechoic nodule at the mid left lobe measures 1.3 cm (TR 4).   Isoechoic nodule at the lateral mid left lobe measures 1.7 cm (TR 3).  LYMPH NODES:   No enlarged lymph nodes seen.   Impression:    Multinodular goiter.  Recommend 1 year follow-up    Assessment:       ICD-10-CM ICD-9-CM   1. Encounter for preventative adult health care examination  Z00.00 V70.0   2. PFO (patent foramen ovale)  Q21.12 745.5   3. History of CVA (cerebrovascular accident)  Z86.73 V12.54   4. Multinodular goiter  E04.2 241.1   5. Encounter for hepatitis C screening test for low risk patient  Z11.59 V73.89   6. Hypertriglyceridemia  E78.1 272.1   7. Hypercoagulable state  D68.59 289.81        Plan:       Health Maintenance Topics with due status: Not Due       Topic Last Completion Date    TETANUS VACCINE 09/25/2015    Hemoglobin A1c (Diabetic Prevention Screening) 09/19/2023        1. Encounter for preventative adult health care examination  - Hepatitis C Antibody; Future  - HIV 1/2 Ag/Ab (4th Gen); Future  Recommend annual eye exam and biannual dental exams.  Limit caffeine and alcohol intake.  Well balanced diet low in sugar/complex carbohydrates and increased vegetable intake encouraged.  Moderate intensity exercise 30 min/day at least 5 days/Wk (total 150 min/Wk) recommended.  Maintain healthy BMI.  Wellness labs reviewed with patient in clinic.  See above preventative health screening at today's visit.    2. PFO (patent foramen ovale)  Keep follow-up with Cardiology, Dr. Hood and Heme/Onc Dr. Rankin.    Continue aspirin and Plavix as prescribed.  Denies recent chest pain, shortness of breath, dizziness or lightheadedness.    Patient will be scheduled for repair of PFO.    3. History of CVA (cerebrovascular accident)  See #2.    4. Multinodular goiter  - TSH; Future  - T4, Free; Future    5. Encounter for hepatitis C screening test for low risk patient  - Hepatitis C Antibody; Future    6. Hypertriglyceridemia  - Lipid Panel; Future    7. Hypercoagulable state  See #2    Follow up in about 6 months (around 7/11/2024).

## 2024-01-12 ENCOUNTER — PATIENT MESSAGE (OUTPATIENT)
Dept: ADMINISTRATIVE | Facility: HOSPITAL | Age: 39
End: 2024-01-12
Payer: COMMERCIAL

## 2024-01-18 PROBLEM — D68.59 HYPERCOAGULABLE STATE: Status: ACTIVE | Noted: 2024-01-18

## 2024-01-19 ENCOUNTER — PATIENT MESSAGE (OUTPATIENT)
Dept: ADMINISTRATIVE | Facility: OTHER | Age: 39
End: 2024-01-19
Payer: COMMERCIAL

## 2024-01-20 ENCOUNTER — PATIENT MESSAGE (OUTPATIENT)
Dept: ADMINISTRATIVE | Facility: OTHER | Age: 39
End: 2024-01-20
Payer: COMMERCIAL

## 2024-01-21 ENCOUNTER — PATIENT MESSAGE (OUTPATIENT)
Dept: ADMINISTRATIVE | Facility: OTHER | Age: 39
End: 2024-01-21
Payer: COMMERCIAL

## 2024-01-22 ENCOUNTER — HOSPITAL ENCOUNTER (OUTPATIENT)
Facility: HOSPITAL | Age: 39
Discharge: HOME | End: 2024-01-23
Attending: INTERNAL MEDICINE | Admitting: INTERNAL MEDICINE
Payer: COMMERCIAL

## 2024-01-22 DIAGNOSIS — R07.9 CHEST PAIN: ICD-10-CM

## 2024-01-22 DIAGNOSIS — Z01.810 PREOP CARDIOVASCULAR EXAM: ICD-10-CM

## 2024-01-22 DIAGNOSIS — Q21.12 PFO (PATENT FORAMEN OVALE): ICD-10-CM

## 2024-01-22 DIAGNOSIS — I35.0 AORTIC STENOSIS: ICD-10-CM

## 2024-01-22 LAB
B-HCG UR QL: NEGATIVE
CTP QC/QA: YES

## 2024-01-22 PROCEDURE — C1760 CLOSURE DEV, VASC: HCPCS | Performed by: INTERNAL MEDICINE

## 2024-01-22 PROCEDURE — 63600175 PHARM REV CODE 636 W HCPCS: Performed by: INTERNAL MEDICINE

## 2024-01-22 PROCEDURE — 93005 ELECTROCARDIOGRAM TRACING: CPT

## 2024-01-22 PROCEDURE — 27201423 OPTIME MED/SURG SUP & DEVICES STERILE SUPPLY: Performed by: INTERNAL MEDICINE

## 2024-01-22 PROCEDURE — 93580 TRANSCATH CLOSURE OF ASD: CPT | Performed by: INTERNAL MEDICINE

## 2024-01-22 PROCEDURE — C1753 CATH, INTRAVAS ULTRASOUND: HCPCS | Performed by: INTERNAL MEDICINE

## 2024-01-22 PROCEDURE — 93662 INTRACARDIAC ECG (ICE): CPT | Performed by: INTERNAL MEDICINE

## 2024-01-22 PROCEDURE — C1769 GUIDE WIRE: HCPCS | Performed by: INTERNAL MEDICINE

## 2024-01-22 PROCEDURE — 21400001 HC TELEMETRY ROOM

## 2024-01-22 PROCEDURE — 81025 URINE PREGNANCY TEST: CPT | Performed by: INTERNAL MEDICINE

## 2024-01-22 PROCEDURE — 99152 MOD SED SAME PHYS/QHP 5/>YRS: CPT | Performed by: INTERNAL MEDICINE

## 2024-01-22 PROCEDURE — C1894 INTRO/SHEATH, NON-LASER: HCPCS | Performed by: INTERNAL MEDICINE

## 2024-01-22 PROCEDURE — 99153 MOD SED SAME PHYS/QHP EA: CPT | Performed by: INTERNAL MEDICINE

## 2024-01-22 PROCEDURE — 25000003 PHARM REV CODE 250: Performed by: INTERNAL MEDICINE

## 2024-01-22 PROCEDURE — 93010 ELECTROCARDIOGRAM REPORT: CPT | Mod: ,,, | Performed by: STUDENT IN AN ORGANIZED HEALTH CARE EDUCATION/TRAINING PROGRAM

## 2024-01-22 DEVICE — PFO OCCLUDER
Type: IMPLANTABLE DEVICE | Site: HEART | Status: FUNCTIONAL
Brand: AMPLATZER™ TALISMAN™

## 2024-01-22 RX ORDER — DIPHENHYDRAMINE HCL 25 MG
50 CAPSULE ORAL
Status: DISCONTINUED | OUTPATIENT
Start: 2024-01-22 | End: 2024-01-22 | Stop reason: HOSPADM

## 2024-01-22 RX ORDER — SODIUM CHLORIDE 9 MG/ML
INJECTION, SOLUTION INTRAVENOUS CONTINUOUS
Status: ACTIVE | OUTPATIENT
Start: 2024-01-22 | End: 2024-01-22

## 2024-01-22 RX ORDER — SODIUM CHLORIDE 0.9 % (FLUSH) 0.9 %
10 SYRINGE (ML) INJECTION
Status: DISCONTINUED | OUTPATIENT
Start: 2024-01-22 | End: 2024-01-22 | Stop reason: HOSPADM

## 2024-01-22 RX ORDER — HEPARIN SODIUM 1000 [USP'U]/ML
INJECTION, SOLUTION INTRAVENOUS; SUBCUTANEOUS
Status: DISCONTINUED | OUTPATIENT
Start: 2024-01-22 | End: 2024-01-22 | Stop reason: HOSPADM

## 2024-01-22 RX ORDER — CLOPIDOGREL BISULFATE 75 MG/1
75 TABLET ORAL DAILY
Status: DISCONTINUED | OUTPATIENT
Start: 2024-01-23 | End: 2024-01-23 | Stop reason: HOSPADM

## 2024-01-22 RX ORDER — FENTANYL CITRATE 50 UG/ML
INJECTION, SOLUTION INTRAMUSCULAR; INTRAVENOUS
Status: DISCONTINUED | OUTPATIENT
Start: 2024-01-22 | End: 2024-01-22 | Stop reason: HOSPADM

## 2024-01-22 RX ORDER — ACETAMINOPHEN 325 MG/1
650 TABLET ORAL EVERY 4 HOURS PRN
Status: DISCONTINUED | OUTPATIENT
Start: 2024-01-22 | End: 2024-01-23 | Stop reason: HOSPADM

## 2024-01-22 RX ORDER — HYDROCODONE BITARTRATE AND ACETAMINOPHEN 5; 325 MG/1; MG/1
1 TABLET ORAL EVERY 4 HOURS PRN
Status: DISCONTINUED | OUTPATIENT
Start: 2024-01-22 | End: 2024-01-23 | Stop reason: HOSPADM

## 2024-01-22 RX ORDER — DIAZEPAM 5 MG/1
10 TABLET ORAL ONCE
Status: COMPLETED | OUTPATIENT
Start: 2024-01-22 | End: 2024-01-22

## 2024-01-22 RX ORDER — CEFAZOLIN SODIUM 1 G/3ML
1 INJECTION, POWDER, FOR SOLUTION INTRAMUSCULAR; INTRAVENOUS
Status: DISCONTINUED | OUTPATIENT
Start: 2024-01-22 | End: 2024-01-22 | Stop reason: HOSPADM

## 2024-01-22 RX ORDER — MORPHINE SULFATE 4 MG/ML
2 INJECTION, SOLUTION INTRAMUSCULAR; INTRAVENOUS
Status: DISCONTINUED | OUTPATIENT
Start: 2024-01-22 | End: 2024-01-23 | Stop reason: HOSPADM

## 2024-01-22 RX ORDER — ONDANSETRON HYDROCHLORIDE 2 MG/ML
4 INJECTION, SOLUTION INTRAVENOUS EVERY 6 HOURS PRN
Status: DISCONTINUED | OUTPATIENT
Start: 2024-01-22 | End: 2024-01-23 | Stop reason: HOSPADM

## 2024-01-22 RX ORDER — MIDAZOLAM HYDROCHLORIDE 1 MG/ML
INJECTION INTRAMUSCULAR; INTRAVENOUS
Status: DISCONTINUED | OUTPATIENT
Start: 2024-01-22 | End: 2024-01-22 | Stop reason: HOSPADM

## 2024-01-22 RX ORDER — B-COMPLEX WITH VITAMIN C
1 TABLET ORAL DAILY
COMMUNITY

## 2024-01-22 RX ORDER — ASPIRIN 81 MG/1
81 TABLET ORAL DAILY
Status: DISCONTINUED | OUTPATIENT
Start: 2024-01-23 | End: 2024-01-23 | Stop reason: HOSPADM

## 2024-01-22 RX ORDER — SODIUM CHLORIDE 9 MG/ML
INJECTION, SOLUTION INTRAVENOUS ONCE
Status: COMPLETED | OUTPATIENT
Start: 2024-01-22 | End: 2024-01-22

## 2024-01-22 RX ORDER — LIDOCAINE HYDROCHLORIDE 10 MG/ML
INJECTION INFILTRATION; PERINEURAL
Status: DISCONTINUED | OUTPATIENT
Start: 2024-01-22 | End: 2024-01-22 | Stop reason: HOSPADM

## 2024-01-22 RX ORDER — CHOLECALCIFEROL (VITAMIN D3) 25 MCG
1000 TABLET ORAL DAILY
COMMUNITY

## 2024-01-22 RX ORDER — ZINC GLUCONATE 50 MG
50 TABLET ORAL DAILY
COMMUNITY

## 2024-01-22 RX ORDER — HYDRALAZINE HYDROCHLORIDE 20 MG/ML
10 INJECTION INTRAMUSCULAR; INTRAVENOUS EVERY 4 HOURS PRN
Status: DISCONTINUED | OUTPATIENT
Start: 2024-01-22 | End: 2024-01-23 | Stop reason: HOSPADM

## 2024-01-22 RX ADMIN — SODIUM CHLORIDE: 9 INJECTION, SOLUTION INTRAVENOUS at 05:01

## 2024-01-22 RX ADMIN — DIPHENHYDRAMINE HYDROCHLORIDE 50 MG: 25 CAPSULE ORAL at 05:01

## 2024-01-22 RX ADMIN — DIAZEPAM 10 MG: 5 TABLET ORAL at 05:01

## 2024-01-22 RX ADMIN — SODIUM CHLORIDE: 9 INJECTION, SOLUTION INTRAVENOUS at 08:01

## 2024-01-22 NOTE — Clinical Note
ID band present and verified. Family is in the lobby. Klisyri Counseling:  I discussed with the patient the risks of Klisyri including but not limited to erythema, scaling, itching, weeping, crusting, and pain.

## 2024-01-22 NOTE — PROGRESS NOTES
Report given to CAITLIN Wise, site dry and intact on both groin. Left dressing has an old shadowing, no active bleeding noted.        01/22/24 1430   Vital Signs   Pulse 86   Resp 20   SpO2 98 %   /74   MAP (mmHg) 84

## 2024-01-23 LAB
ANION GAP SERPL CALC-SCNC: 3 MEQ/L
BASOPHILS # BLD AUTO: 0.03 X10(3)/MCL
BASOPHILS NFR BLD AUTO: 0.4 %
BSA FOR ECHO PROCEDURE: 1.83 M2
BUN SERPL-MCNC: 8.8 MG/DL (ref 7–18.7)
CALCIUM SERPL-MCNC: 9.3 MG/DL (ref 8.4–10.2)
CHLORIDE SERPL-SCNC: 110 MMOL/L (ref 98–107)
CO2 SERPL-SCNC: 26 MMOL/L (ref 22–29)
CREAT SERPL-MCNC: 0.79 MG/DL (ref 0.55–1.02)
CREAT/UREA NIT SERPL: 11
CV ECHO LV RWT: 0.39 CM
DOP CALC LVOT AREA: 3.5 CM2
DOP CALC LVOT DIAMETER: 2.1 CM
ECHO LV POSTERIOR WALL: 0.9 CM (ref 0.6–1.1)
EOSINOPHIL # BLD AUTO: 0.21 X10(3)/MCL (ref 0–0.9)
EOSINOPHIL NFR BLD AUTO: 2.7 %
ERYTHROCYTE [DISTWIDTH] IN BLOOD BY AUTOMATED COUNT: 12.8 % (ref 11.5–17)
FRACTIONAL SHORTENING: 37 % (ref 28–44)
GFR SERPLBLD CREATININE-BSD FMLA CKD-EPI: >60 MLS/MIN/1.73/M2
GLUCOSE SERPL-MCNC: 89 MG/DL (ref 74–100)
HCT VFR BLD AUTO: 38.9 % (ref 37–47)
HGB BLD-MCNC: 12.8 G/DL (ref 12–16)
IMM GRANULOCYTES # BLD AUTO: 0.01 X10(3)/MCL (ref 0–0.04)
IMM GRANULOCYTES NFR BLD AUTO: 0.1 %
INTERVENTRICULAR SEPTUM: 0.8 CM (ref 0.6–1.1)
LEFT ATRIUM SIZE: 2.9 CM
LEFT INTERNAL DIMENSION IN SYSTOLE: 2.9 CM (ref 2.1–4)
LEFT VENTRICLE DIASTOLIC VOLUME INDEX: 54.36 ML/M2
LEFT VENTRICLE DIASTOLIC VOLUME: 97.3 ML
LEFT VENTRICLE MASS INDEX: 71 G/M2
LEFT VENTRICLE SYSTOLIC VOLUME INDEX: 18 ML/M2
LEFT VENTRICLE SYSTOLIC VOLUME: 32.2 ML
LEFT VENTRICULAR INTERNAL DIMENSION IN DIASTOLE: 4.6 CM (ref 3.5–6)
LEFT VENTRICULAR MASS: 127.66 G
LYMPHOCYTES # BLD AUTO: 2.35 X10(3)/MCL (ref 0.6–4.6)
LYMPHOCYTES NFR BLD AUTO: 30.5 %
MCH RBC QN AUTO: 28.6 PG (ref 27–31)
MCHC RBC AUTO-ENTMCNC: 32.9 G/DL (ref 33–36)
MCV RBC AUTO: 87 FL (ref 80–94)
MONOCYTES # BLD AUTO: 0.69 X10(3)/MCL (ref 0.1–1.3)
MONOCYTES NFR BLD AUTO: 8.9 %
NEUTROPHILS # BLD AUTO: 4.42 X10(3)/MCL (ref 2.1–9.2)
NEUTROPHILS NFR BLD AUTO: 57.4 %
NRBC BLD AUTO-RTO: 0 %
PLATELET # BLD AUTO: 193 X10(3)/MCL (ref 130–400)
PMV BLD AUTO: 10.5 FL (ref 7.4–10.4)
POCT GLUCOSE: 86 MG/DL (ref 70–110)
POTASSIUM SERPL-SCNC: 4.4 MMOL/L (ref 3.5–5.1)
RBC # BLD AUTO: 4.47 X10(6)/MCL (ref 4.2–5.4)
RIGHT VENTRICULAR END-DIASTOLIC DIMENSION: 1.9 CM
SODIUM SERPL-SCNC: 139 MMOL/L (ref 136–145)
WBC # SPEC AUTO: 7.71 X10(3)/MCL (ref 4.5–11.5)
Z-SCORE OF LEFT VENTRICULAR DIMENSION IN END DIASTOLE: -0.73
Z-SCORE OF LEFT VENTRICULAR DIMENSION IN END SYSTOLE: -0.42

## 2024-01-23 PROCEDURE — 80048 BASIC METABOLIC PNL TOTAL CA: CPT | Performed by: INTERNAL MEDICINE

## 2024-01-23 PROCEDURE — 93005 ELECTROCARDIOGRAM TRACING: CPT

## 2024-01-23 PROCEDURE — 93010 ELECTROCARDIOGRAM REPORT: CPT | Mod: ,,, | Performed by: INTERNAL MEDICINE

## 2024-01-23 PROCEDURE — 85025 COMPLETE CBC W/AUTO DIFF WBC: CPT | Performed by: INTERNAL MEDICINE

## 2024-01-23 PROCEDURE — 25000003 PHARM REV CODE 250: Performed by: INTERNAL MEDICINE

## 2024-01-23 RX ORDER — CLOPIDOGREL BISULFATE 75 MG/1
75 TABLET ORAL DAILY
Qty: 30 TABLET | Refills: 11 | Status: SHIPPED | OUTPATIENT
Start: 2024-01-23 | End: 2025-01-22

## 2024-01-23 RX ORDER — ASPIRIN 81 MG/1
81 TABLET ORAL DAILY
Qty: 30 TABLET | Refills: 11 | Status: SHIPPED | OUTPATIENT
Start: 2024-01-23

## 2024-01-23 RX ADMIN — CLOPIDOGREL BISULFATE 75 MG: 75 TABLET ORAL at 09:01

## 2024-01-23 RX ADMIN — ASPIRIN 81 MG: 81 TABLET, COATED ORAL at 09:01

## 2024-01-23 NOTE — PLAN OF CARE
01/23/24 1042   Discharge Assessment   Assessment Type Discharge Planning Assessment   Confirmed/corrected address, phone number and insurance Yes   Confirmed Demographics Correct on Facesheet   Source of Information patient;family   Does patient/caregiver understand observation status   (inpatient)   Communicated DIANE with patient/caregiver Yes   Reason For Admission PFO Patent foramen ovale   People in Home spouse   Facility Arrived From: home   Do you expect to return to your current living situation? Yes   Do you have help at home or someone to help you manage your care at home? Yes   Who are your caregiver(s) and their phone number(s)? spouse   Prior to hospitilization cognitive status: Unable to Assess   Current cognitive status: Alert/Oriented   Walking or Climbing Stairs Difficulty no   Dressing/Bathing Difficulty no   Equipment Currently Used at Home none   Readmission within 30 days? No   Patient currently being followed by outpatient case management? No   Do you currently have service(s) that help you manage your care at home? No   Do you take prescription medications? Yes   Do you have prescription coverage? Yes   Do you have any problems affording any of your prescribed medications? No   Who is going to help you get home at discharge? spouse/Fredi   How do you get to doctors appointments? car, drives self   Are you on dialysis? No   Discharge Plan A Home   Discharge Plan B Home   DME Needed Upon Discharge  none   Discharge Plan discussed with: Patient   Transition of Care Barriers None   Housing Stability   In the last 12 months, was there a time when you were not able to pay the mortgage or rent on time? N   In the last 12 months, was there a time when you did not have a steady place to sleep or slept in a shelter (including now)? N   Transportation Needs   In the past 12 months, has lack of transportation kept you from medical appointments or from getting medications? no   In the past 12 months, has  lack of transportation kept you from meetings, work, or from getting things needed for daily living? No   Social Connections   Are you , , , , never , or living with a partner?    OTHER   Name(s) of People in Home burke [place]'s PCP is Maria A Castañeda in [place].  She is employed and will need a works excuse.  Plans to disch today.   pending RVP

## 2024-01-23 NOTE — NURSING
Nurses Note -- 4 Eyes      1/22/2024   6:56 PM      Skin assessed during: Admit      [x] No Altered Skin Integrity Present    []Prevention Measures Documented      [] Yes- Altered Skin Integrity Present or Discovered   [] LDA Added if Not in Epic (Describe Wound)   [] New Altered Skin Integrity was Present on Admit and Documented in LDA   [] Wound Image Taken    Wound Care Consulted? No    Attending Nurse: Shari Jean Baptiste RN/Staff Member:  Mikaela

## 2024-01-23 NOTE — PLAN OF CARE
Pt's PCP is Maria A Castañeda in [place].  She is employed and will need a works excuse.  Plans to disch today.

## 2024-01-23 NOTE — DISCHARGE SUMMARY
"[facility] Unit  Cardiology  Discharge Summary      Patient Name: Sharon Shaver  MRN: [id number]  Admission Date: 1/22/2024  Hospital Length of Stay: 1 days  Discharge Date and Time:  01/23/2024 11:02 AM  Attending Physician: Torey Hood MD  Discharging Provider: MORTEZA Claire  Primary Care Physician: Ema Castañeda MD    HPI: Ms. Shaver is a 38 year old female who is known to [facility], Dr. Hood. She presents to [facility] on 1.22.24 for a scheduled PFO closure with Dr. Hood. See procedure below.     Hospital course:   1.23.24: NAD. Denies CP, SOB, or palps. "I am feeling good." Bilateral groin sites benign. SR on tele. BP stable.     Procedure(s) (LRB):  Closure, PFO (N/A)     Procedures performed:  1. PFO closure with 30 mm [place] device  2. Intracardiac ECHO pre, post PFO closure  3. Ultrasound-guided bilateral groin access  4. Balloon sizing of PFO  5. Perclose closure bilateral access sites     Findings:  1. PFO documented by ice with adequate rims for closure  2. Post closure well-seated device with tissue bridge noted between left and right atrial discs  3.  Balloon sizing adequate for 30 mm device    Plan:  1. Continue dual anti-platelet therapy  2. Observe overnight  3. Echo in a.m. to evaluate for effusion and closure device     Final Active Diagnoses:    Diagnosis Date Noted POA    PRINCIPAL PROBLEM:  PFO (patent foramen ovale) [Q21.12] 09/26/2023 Not Applicable      Problems Resolved During this Admission:       Discharged Condition: stable    Review of Systems   Cardiovascular:  Negative for chest pain and palpitations.   Respiratory:  Negative for shortness of breath.        Physical Exam  HENT:      Head: Normocephalic.      Nose: Nose normal.      Mouth/Throat:      Mouth: Mucous membranes are dry.   Eyes:      Extraocular Movements: Extraocular movements intact.   Cardiovascular:      Rate and Rhythm: Normal rate and regular rhythm.      Pulses: " Normal pulses.      Heart sounds: Normal heart sounds.      Comments: Bilateral groin sites benign -> soft, nontender. No hematoma noted. + 2 peripheral pulse  Pulmonary:      Effort: Pulmonary effort is normal.      Breath sounds: Normal breath sounds.   Abdominal:      Palpations: Abdomen is soft.   Skin:     General: Skin is warm and dry.   Neurological:      Mental Status: She is alert and oriented to person, place, and time. Mental status is at baseline.   Psychiatric:         Mood and Affect: Mood normal.         Behavior: Behavior normal.         Judgment: Judgment normal.         Disposition: Home or Self Care    Follow Up:   Follow-up Information       Torey Hood MD Follow up on 1/30/2024.    Specialty: Cardiology  Why: @ 9:50AM  Contact information:  [address] [facility]  [facility] [id number] [phone]                           Patient Instructions:      Lifting restrictions   Order Comments: Nothing > 10 lbs x 3 days     No driving until:   Order Comments: X 3 days     Activity as tolerated     Medications:  Reconciled Home Medications:      Medication List        CHANGE how you take these medications      clopidogreL 75 mg tablet  Commonly known as: PLAVIX  Take 1 tablet (75 mg total) by mouth once daily.  What changed: when to take this            CONTINUE taking these medications      aspirin 81 MG EC tablet  Commonly known as: ECOTRIN  Take 1 tablet (81 mg total) by mouth once daily.     B-complex with vitamin C tablet  Commonly known as: Z-Bec or Equiv  Take 1 tablet by mouth once daily.     TURMERIC ORAL  Take 1,000 mg by mouth Daily.     vitamin D 1000 units Tab  Commonly known as: VITAMIN D3  Take 1,000 Units by mouth once daily.     zinc gluconate 50 mg tablet  Take 50 mg by mouth once daily.              Impression:  PFO    - s/p PFO Closure (1.22.24)  Hx of CVA    -  Left Frontal Lobe Nonhemorrhagic CVA/Left Cerebellar Old Lacunar  Infarct (9/23)  Menstrual Cycle Related Migraines    Plan:   Pt is stable for discharge home today.  Echo reviewed. No evidence of effusion. Successful PFO closure.  Continue ASA 81 mg daily & Plavix 75 mg daily.  Groin precautions reviewed.  Keep scheduled hospital f/u with Dr. Hood on 1.30.24.     Time spent on the discharge of patient: 32 minutes    Paula Christianson, MORTEZA  Cardiology  [facility] - Observation Unit     Physician addendum:  I have seen and examined this patient as a split-shared visit with the BARBARA d/t complicated medical management of above problems written in assessment and high acuity requiring physician expertise in medical decision-making. I performed the substantive portion of the history and exam. Above medical decision-making is also formulated by me.    Pt. Would like to be discharged.   Plan:   Medications reviewed. Discharge to home. Answered all questions prior to discharge.   F/U scheduled as above.     Beto Taylor MD  Cardiologist

## 2024-01-23 NOTE — PLAN OF CARE
Problem: Adult Inpatient Plan of Care  Goal: Plan of Care Review  Outcome: Ongoing, Progressing  Flowsheets (Taken 1/23/2024 0481)  Plan of Care Reviewed With: patient  Goal: Absence of Hospital-Acquired Illness or Injury  Outcome: Ongoing, Progressing  Goal: Readiness for Transition of Care  Outcome: Ongoing, Progressing

## 2024-01-24 ENCOUNTER — PATIENT MESSAGE (OUTPATIENT)
Dept: ADMINISTRATIVE | Facility: OTHER | Age: 39
End: 2024-01-24
Payer: COMMERCIAL

## 2024-01-24 VITALS
SYSTOLIC BLOOD PRESSURE: 105 MMHG | WEIGHT: 166.69 LBS | DIASTOLIC BLOOD PRESSURE: 70 MMHG | HEART RATE: 88 BPM | OXYGEN SATURATION: 100 % | TEMPERATURE: 98 F | BODY MASS INDEX: 29.54 KG/M2 | RESPIRATION RATE: 18 BRPM | HEIGHT: 63 IN

## 2024-01-25 ENCOUNTER — PATIENT MESSAGE (OUTPATIENT)
Dept: ADMINISTRATIVE | Facility: OTHER | Age: 39
End: 2024-01-25
Payer: COMMERCIAL

## 2024-02-02 ENCOUNTER — TELEPHONE (OUTPATIENT)
Dept: NEUROLOGY | Facility: CLINIC | Age: 39
End: 2024-02-02
Payer: COMMERCIAL

## 2024-03-19 ENCOUNTER — DOCUMENTATION ONLY (OUTPATIENT)
Dept: FAMILY MEDICINE | Facility: CLINIC | Age: 39
End: 2024-03-19
Payer: COMMERCIAL

## 2024-03-25 ENCOUNTER — DOCUMENTATION ONLY (OUTPATIENT)
Dept: FAMILY MEDICINE | Facility: CLINIC | Age: 39
End: 2024-03-25
Payer: COMMERCIAL

## 2024-03-25 LAB — PAP RECOMMENDATION EXT: NORMAL

## 2024-07-17 ENCOUNTER — OFFICE VISIT (OUTPATIENT)
Dept: FAMILY MEDICINE | Facility: CLINIC | Age: 39
End: 2024-07-17
Payer: COMMERCIAL

## 2024-07-17 VITALS
BODY MASS INDEX: 32.21 KG/M2 | TEMPERATURE: 98 F | OXYGEN SATURATION: 99 % | HEART RATE: 69 BPM | HEIGHT: 63 IN | WEIGHT: 181.81 LBS | SYSTOLIC BLOOD PRESSURE: 105 MMHG | RESPIRATION RATE: 18 BRPM | DIASTOLIC BLOOD PRESSURE: 73 MMHG

## 2024-07-17 DIAGNOSIS — Z86.73 HISTORY OF CVA (CEREBROVASCULAR ACCIDENT): ICD-10-CM

## 2024-07-17 DIAGNOSIS — E04.2 MULTINODULAR GOITER: ICD-10-CM

## 2024-07-17 DIAGNOSIS — Z87.74 S/P PATENT FORAMEN OVALE CLOSURE: Primary | ICD-10-CM

## 2024-07-17 DIAGNOSIS — G43.009 MIGRAINE WITHOUT AURA AND WITHOUT STATUS MIGRAINOSUS, NOT INTRACTABLE: ICD-10-CM

## 2024-07-17 PROCEDURE — 3078F DIAST BP <80 MM HG: CPT | Mod: CPTII,,, | Performed by: FAMILY MEDICINE

## 2024-07-17 PROCEDURE — 3008F BODY MASS INDEX DOCD: CPT | Mod: CPTII,,, | Performed by: FAMILY MEDICINE

## 2024-07-17 PROCEDURE — 3074F SYST BP LT 130 MM HG: CPT | Mod: CPTII,,, | Performed by: FAMILY MEDICINE

## 2024-07-17 PROCEDURE — 99214 OFFICE O/P EST MOD 30 MIN: CPT | Mod: ,,, | Performed by: FAMILY MEDICINE

## 2024-07-17 PROCEDURE — 1159F MED LIST DOCD IN RCRD: CPT | Mod: CPTII,,, | Performed by: FAMILY MEDICINE

## 2024-07-17 PROCEDURE — 1160F RVW MEDS BY RX/DR IN RCRD: CPT | Mod: CPTII,,, | Performed by: FAMILY MEDICINE

## 2024-07-17 NOTE — PROGRESS NOTES
Subjective:      Patient ID: Sharon Shaver is a 38 y.o. female.    Chief Complaint: Follow-up (6 month f/u)    Disclaimer:  This note is prepared using voice recognition software and as such is likely to have errors despite attempts at proofreading. Please contact me for questions.     38-year-old female who presents for six-month follow-up.  The patient states that migraines have decreased in frequency.  She states that she is currently having 1 migraine per month.  She was previously being followed by Neurology and states that she it was suggested that she start taking vitamins.  She is currently taking gingko biloba and admits to improvement in symptoms.  Multinodular goiter: US due 09/2024.  Last TSH 2.544.  Patient is s/p PFO repair in 01/2024.  She is being followed by Cardiology, Dr. Hood. She denies SOB, chest pain, headaches or palpitations. The patient states she has been cleared by cardiology to return to normal activity. She recently began exercising again and states she has been eating a well balanced diet.    Preventative Health:  Colorectal cancer screening: Due at age 45.  Breast cancer screening: Due at age 40.  Cervical cancer screening: Last pap smear 01/29/2024.    Past Medical History:   Diagnosis Date    History of CVA (cerebrovascular accident) 10/31/2023    Multinodular goiter 10/31/2023    PFO (patent foramen ovale) 9/26/2023        Current Outpatient Medications on File Prior to Visit   Medication Sig Dispense Refill    aspirin (ECOTRIN) 81 MG EC tablet Take 1 tablet (81 mg total) by mouth once daily. 30 tablet 11    B-complex with vitamin C (Z-BEC OR EQUIV) tablet Take 1 tablet by mouth once daily.      TURMERIC ORAL Take 1,000 mg by mouth Daily.      vitamin D (VITAMIN D3) 1000 units Tab Take 1,000 Units by mouth once daily.       No current facility-administered medications on file prior to visit.        Review of patient's allergies indicates:   Allergen Reactions    Sulfa  "(sulfonamide antibiotics) Hives      Review of Systems   Constitutional:  Negative for chills, diaphoresis and fever.   Eyes:  Negative for blurred vision and double vision.   Respiratory:  Negative for cough and shortness of breath.    Cardiovascular:  Negative for chest pain, palpitations and leg swelling.   Gastrointestinal:  Negative for abdominal pain, diarrhea, nausea and vomiting.   Skin:  Negative for rash.   Neurological:  Positive for headaches. Negative for dizziness.       Objective:     Vitals:    07/17/24 1308   BP: 105/73   BP Location: Right arm   Patient Position: Sitting   Pulse: 69   Resp: 18   Temp: 98.1 °F (36.7 °C)   TempSrc: Oral   SpO2: 99%   Weight: 82.5 kg (181 lb 12.8 oz)   Height: 5' 3" (1.6 m)     Physical Exam  Vitals reviewed.   Constitutional:       General: She is not in acute distress.     Appearance: Normal appearance. She is not ill-appearing, toxic-appearing or diaphoretic.   HENT:      Head: Normocephalic.   Eyes:      General:         Right eye: No discharge.         Left eye: No discharge.      Extraocular Movements: Extraocular movements intact.      Conjunctiva/sclera: Conjunctivae normal.   Cardiovascular:      Rate and Rhythm: Normal rate and regular rhythm.      Pulses: Normal pulses.      Heart sounds: Normal heart sounds. No murmur heard.     No friction rub. No gallop.   Pulmonary:      Effort: Pulmonary effort is normal. No respiratory distress.      Breath sounds: Normal breath sounds. No stridor. No wheezing, rhonchi or rales.   Musculoskeletal:         General: Normal range of motion.      Cervical back: Normal range of motion and neck supple. No rigidity.   Skin:     General: Skin is warm and dry.      Coloration: Skin is not pale.   Neurological:      General: No focal deficit present.      Mental Status: She is alert and oriented to person, place, and time. Mental status is at baseline.   Psychiatric:         Mood and Affect: Mood normal.         Behavior: " Behavior normal.         Thought Content: Thought content normal.         Judgment: Judgment normal.         Assessment:     1. S/P patent foramen ovale closure    2. History of CVA (cerebrovascular accident)    3. Multinodular goiter    4. Migraine without aura and without status migrainosus, not intractable      Plan:     1. S/P patent foramen ovale closure  Continue aspirin 81 mg daily.    Keep follow-up with Cardiology.    Encouraged well-balanced diet and exercise.      2. History of CVA (cerebrovascular accident)  See #1.    3. Multinodular goiter  - US Thyroid; Future  - TSH; Future  - T4, Free; Future  - T3, Free (OLG); Future    4. Migraine without aura and without status migrainosus, not intractable  Stable.  Discussed potential side effects of gingko biloba.  Keep follow up with neurology.  Encouraged adequate hydration.  Avoid triggers.  ED precautions for severe or persistent headache, vision changes, or neurologic changes.     Follow up in about 6 months (around 1/17/2025) for Wellness Visit.  Sharon Shaver was given education on their disease process and medications.

## 2024-09-17 PROBLEM — Q21.12 PFO (PATENT FORAMEN OVALE): Status: RESOLVED | Noted: 2023-09-26 | Resolved: 2024-09-17

## 2024-09-17 PROBLEM — Z86.73 HISTORY OF CVA (CEREBROVASCULAR ACCIDENT): Status: RESOLVED | Noted: 2023-10-31 | Resolved: 2024-09-17

## 2024-09-18 ENCOUNTER — HOSPITAL ENCOUNTER (OUTPATIENT)
Dept: RADIOLOGY | Facility: HOSPITAL | Age: 39
Discharge: HOME | End: 2024-09-18
Attending: FAMILY MEDICINE
Payer: COMMERCIAL

## 2024-09-18 DIAGNOSIS — E04.2 MULTINODULAR GOITER: ICD-10-CM

## 2024-09-18 PROCEDURE — 76536 US EXAM OF HEAD AND NECK: CPT | Mod: TC

## 2024-09-26 ENCOUNTER — TELEPHONE (OUTPATIENT)
Dept: FAMILY MEDICINE | Facility: CLINIC | Age: 39
End: 2024-09-26
Payer: COMMERCIAL

## 2024-09-26 NOTE — TELEPHONE ENCOUNTER
----- Message from Gideon Perdomo sent at 9/25/2024  1:54 PM CDT -----  .Type:  Needs Medical Advice    Who Called: Sharon   Symptoms (please be specific):    How long has patient had these symptoms:    Pharmacy name and phone #:    Would the patient rather a call back or a response via [facility]?   Best Call Back Number: [phone]  Additional Information: Patient requested a call back from nurse re: a referral she needs for a biopsy she told me that the doctor was setting her up with a doctor but no one has called her back.

## 2024-09-26 NOTE — TELEPHONE ENCOUNTER
Patient voiced concerns that she hasn't heard anything in regards to ENT referral. I encouraged patient to allow at least 1 more week. Referral was ordered on 9/18/24. I informed patient if she didn't hear anything by next week I will follow up on referral.

## 2024-10-07 ENCOUNTER — PATIENT MESSAGE (OUTPATIENT)
Dept: FAMILY MEDICINE | Facility: CLINIC | Age: 39
End: 2024-10-07
Payer: COMMERCIAL

## 2024-10-07 DIAGNOSIS — R93.89 ABNORMAL THYROID ULTRASOUND: Primary | ICD-10-CM

## 2024-10-07 NOTE — TELEPHONE ENCOUNTER
This is Dr. Choi covering for Dr. Castañeda.    Will forward message to Sahara regarding ENT referral.

## 2024-10-10 NOTE — TELEPHONE ENCOUNTER
Returned patient called and informed her new ENT referral was sent to Dr. Sahara Polanco. Patient asked if she could transfer her care with Dr. Castañeda when she leaves. I explained to patient that [place]. She verbalized being open to still she Dr. Castañeda.

## 2024-11-04 ENCOUNTER — HOSPITAL ENCOUNTER (OUTPATIENT)
Dept: RADIOLOGY | Facility: HOSPITAL | Age: 39
Discharge: HOME | End: 2024-11-04
Attending: OTOLARYNGOLOGY
Payer: COMMERCIAL

## 2024-11-04 DIAGNOSIS — E04.1 THYROID NODULE: ICD-10-CM

## 2024-11-04 LAB
BASOPHILS # BLD AUTO: 0.04 X10(3)/MCL
BASOPHILS NFR BLD AUTO: 0.5 %
EOSINOPHIL # BLD AUTO: 0.04 X10(3)/MCL (ref 0–0.9)
EOSINOPHIL NFR BLD AUTO: 0.5 %
ERYTHROCYTE [DISTWIDTH] IN BLOOD BY AUTOMATED COUNT: 13.1 % (ref 11.5–17)
HCT VFR BLD AUTO: 37.2 % (ref 37–47)
HGB BLD-MCNC: 12.4 G/DL (ref 12–16)
IMM GRANULOCYTES # BLD AUTO: 0.02 X10(3)/MCL (ref 0–0.04)
IMM GRANULOCYTES NFR BLD AUTO: 0.3 %
INR PPP: 1
LYMPHOCYTES # BLD AUTO: 1.5 X10(3)/MCL (ref 0.6–4.6)
LYMPHOCYTES NFR BLD AUTO: 18.8 %
MCH RBC QN AUTO: 29.5 PG (ref 27–31)
MCHC RBC AUTO-ENTMCNC: 33.3 G/DL (ref 33–36)
MCV RBC AUTO: 88.6 FL (ref 80–94)
MONOCYTES # BLD AUTO: 0.43 X10(3)/MCL (ref 0.1–1.3)
MONOCYTES NFR BLD AUTO: 5.4 %
NEUTROPHILS # BLD AUTO: 5.97 X10(3)/MCL (ref 2.1–9.2)
NEUTROPHILS NFR BLD AUTO: 74.5 %
NRBC BLD AUTO-RTO: 0 %
PLATELET # BLD AUTO: 246 X10(3)/MCL (ref 130–400)
PMV BLD AUTO: 10.3 FL (ref 7.4–10.4)
PROTHROMBIN TIME: 13.4 SECONDS (ref 12.5–14.5)
RBC # BLD AUTO: 4.2 X10(6)/MCL (ref 4.2–5.4)
WBC # BLD AUTO: 8 X10(3)/MCL (ref 4.5–11.5)

## 2024-11-04 PROCEDURE — 85025 COMPLETE CBC W/AUTO DIFF WBC: CPT | Performed by: RADIOLOGY

## 2024-11-04 PROCEDURE — 76942 ECHO GUIDE FOR BIOPSY: CPT | Mod: TC

## 2024-11-04 PROCEDURE — 36415 COLL VENOUS BLD VENIPUNCTURE: CPT | Performed by: RADIOLOGY

## 2024-11-04 PROCEDURE — 85610 PROTHROMBIN TIME: CPT | Performed by: RADIOLOGY

## 2024-11-04 NOTE — H&P
IR Pre Procedure Note   Subjective:      Sharon Shaver is a 39 y.o. female who presents today with Left Lobe Thyroid Nodule. This consultation is requested for the specific conditions prompting preoperative evaluation for: Left Thyroid Bikopsy.     Planned anesthesia: local.     The following portions of the patient's history were reviewed and updated as appropriate: allergies, current medications, past family history, past medical history, past social history, past surgical history, and problem list.    Review of Systems:  Pertinent items are noted in HPI.     Past Medical History:  Past Medical History:   Diagnosis Date    History of CVA (cerebrovascular accident) 10/31/2023    Multinodular goiter 10/31/2023    PFO (patent foramen ovale) 9/26/2023        Social History:  Social History     Socioeconomic History    Marital status:    Tobacco Use    Smoking status: Never    Smokeless tobacco: Never   Substance and Sexual Activity    Alcohol use: Never    Drug use: Never    Sexual activity: Yes     Partners: Male     Social Drivers of Health     Financial Resource Strain: Low Risk  (1/22/2024)    Overall Financial Resource Strain (CARDIA)     Difficulty of Paying Living Expenses: Not very hard   Food Insecurity: No Food Insecurity (1/22/2024)    Hunger Vital Sign     Worried About Running Out of Food in the Last Year: Never true     Ran Out of Food in the Last Year: Never true   Transportation Needs: No Transportation Needs (1/23/2024)    PRAPARE - Transportation     Lack of Transportation (Medical): No     Lack of Transportation (Non-Medical): No   Physical Activity: Inactive (1/22/2024)    Exercise Vital Sign     Days of Exercise per Week: 0 days     Minutes of Exercise per Session: 60 min   Stress: No Stress Concern Present (1/22/2024)    [employer] - Occupational Stress Questionnaire     Feeling of Stress : Not at all   Housing Stability: Low Risk  (1/23/2024)    Housing  Stability Vital Sign     Unable to Pay for Housing in the Last Year: No     Number of Places Lived in the Last Year: 1     Unstable Housing in the Last Year: No   Recent Concern: Housing Stability - High Risk (1/22/2024)    Housing Stability Vital Sign     Unable to Pay for Housing in the Last Year: Yes     Number of Places Lived in the Last Year: 1     Unstable Housing in the Last Year: Yes        Medication History:  Current Outpatient Medications on File Prior to Encounter    Order #: [id number]: Normal    Order #: [id number]: Historical Med    Order #: [id number]: Historical Med    Order #: [id number]: Historical Med        Allergies:  Review of patient's allergies indicates:   Allergen Reactions    Sulfa (sulfonamide antibiotics) Hives         Objective:     Physical Exam:  Vital Signs:    General: No acute distress; awake, alert, and oriented x 3  HEENT: Normocephalic, atraumatic, extraocular movements intact  Cardiac:Positive S1, S2  Respiratory: Unlabored respirations  ABD: Nontender, soft  Neurological: Grossly intact; moves all 4 extremities without difficulty  Ext: No cyanosis, clubbing, or edema    Predictors of intubation difficulty:       Imaging  The pertinent imaging was personally reviewed for procedural planning, safety, and feasibility by Dr Norris Randall.    Lab Review   Lab Results   Component Value Date     01/23/2024    K 4.4 01/23/2024     (H) 01/23/2024    CO2 26 01/23/2024    BUN 8.8 01/23/2024    CREATININE 0.79 01/23/2024    GLUCOSE 89 01/23/2024    CALCIUM 9.3 01/23/2024     Lab Results   Component Value Date    WBC 7.71 01/23/2024    HGB 12.8 01/23/2024    HCT 38.9 01/23/2024    MCV 87.0 01/23/2024     01/23/2024         Assessment:        39 y.o. female presenting with Left Thyroid Nodule.  The patient is being evaluated for Left thyroid Biopsy.         Plan:        Proceed with biopsy

## 2024-11-04 NOTE — DISCHARGE SUMMARY
Radiology Post-Procedure Note    Pre Op Diagnosis: Left Thyroid Nodule    Post Op Diagnosis: Same    Secondary Diagnoses:   Problem List Items Addressed This Visit    None  Visit Diagnoses       Thyroid nodule        Relevant Orders    US Biopsy Thyroid (xpd)             Procedure: US Guided Left Thyroid Nodule Biopsy    Procedure performed by: Norris Randall MD    Assistant: None    Written Informed Consent Obtained: Yes    Specimen Removed: FNA x 4    Estimated Blood Loss: <10 cc    Condition: Stable    Outcome: The patient tolerated the procedure well and was without complications.    For further details please see the imaging report associated.    Disposition: Home or Self Care    Follow Up: With primary care provider    Discharge Instructions:  No discharge procedures on file.       Time Spent On Discharge: 2 minutes

## 2024-11-05 LAB — PSYCHE PATHOLOGY RESULT: NORMAL

## (undated) DEVICE — SUT SILK 0 BLK BR CT-1 30IN

## (undated) DEVICE — Device

## (undated) DEVICE — GUIDEWIRE AMPLATZ XSTIFF 260CM

## (undated) DEVICE — SYS WASTE FLD DISPOSAL 1400ML

## (undated) DEVICE — SHEATH PINNACLE 8FR

## (undated) DEVICE — GUIDEWIRE STF .035X180CM STR

## (undated) DEVICE — SYR 50CC LL

## (undated) DEVICE — CATH VIEWFLEX XTRA ICE 9F 90CM

## (undated) DEVICE — CANNULA NASAL ADULT

## (undated) DEVICE — BLLN SIZING AGA 24MM

## (undated) DEVICE — SUT PERCLOSE PROSTYLE MEDIATE

## (undated) DEVICE — TOWEL OR DISP STRL BLUE 4/PK

## (undated) DEVICE — GUIDEWIRE INQWIRE SE 3MM JTIP

## (undated) DEVICE — SET MICROPUNCTUREECHO STIFF

## (undated) DEVICE — SHEATH 10FR 23CM BRITE TIP

## (undated) DEVICE — INTRAVASCULAR DELIVERY SYSTEM
Type: IMPLANTABLE DEVICE | Site: HEART | Status: NON-FUNCTIONAL
Brand: AMPLATZER™ TREVISIO™
Removed: 2024-01-22

## (undated) DEVICE — SHEATH INTRODUCER 6FR 11CM

## (undated) DEVICE — COVER FLEXI-FEEL PROBE 6X58IN

## (undated) DEVICE — CATH IMPULSE MPA1 5FR 100CM

## (undated) DEVICE — PAD DEFIB CADENCE ADULT R2